# Patient Record
Sex: MALE | Race: WHITE | Employment: FULL TIME | URBAN - METROPOLITAN AREA
[De-identification: names, ages, dates, MRNs, and addresses within clinical notes are randomized per-mention and may not be internally consistent; named-entity substitution may affect disease eponyms.]

---

## 2017-06-16 ENCOUNTER — TELEPHONE (OUTPATIENT)
Dept: FAMILY MEDICINE CLINIC | Age: 57
End: 2017-06-16

## 2017-06-16 RX ORDER — BLOOD-GLUCOSE METER
EACH MISCELLANEOUS
Qty: 1 KIT | Refills: 0 | Status: SHIPPED | OUTPATIENT
Start: 2017-06-16 | End: 2018-02-07 | Stop reason: SDUPTHER

## 2017-06-16 RX ORDER — LANCETS 30 GAUGE
EACH MISCELLANEOUS
Qty: 100 EACH | Refills: 3 | Status: SHIPPED | OUTPATIENT
Start: 2017-06-16

## 2017-06-22 PROBLEM — M19.90 SENILE ARTHRITIS: Status: ACTIVE | Noted: 2017-06-22

## 2017-08-25 RX ORDER — LISINOPRIL 5 MG/1
TABLET ORAL
Qty: 30 TABLET | Refills: 3 | Status: SHIPPED | OUTPATIENT
Start: 2017-08-25 | End: 2017-11-02 | Stop reason: SDUPTHER

## 2017-08-25 RX ORDER — LISINOPRIL 5 MG/1
TABLET ORAL
Qty: 30 TABLET | Refills: 0 | OUTPATIENT
Start: 2017-08-25

## 2017-08-25 RX ORDER — MELOXICAM 7.5 MG/1
TABLET ORAL
Qty: 30 TABLET | Refills: 3 | Status: SHIPPED | OUTPATIENT
Start: 2017-08-25 | End: 2017-11-02 | Stop reason: SDUPTHER

## 2017-11-02 ENCOUNTER — OFFICE VISIT (OUTPATIENT)
Dept: FAMILY MEDICINE CLINIC | Age: 57
End: 2017-11-02

## 2017-11-02 VITALS
TEMPERATURE: 97.7 F | HEIGHT: 68 IN | HEART RATE: 84 BPM | DIASTOLIC BLOOD PRESSURE: 90 MMHG | SYSTOLIC BLOOD PRESSURE: 142 MMHG | OXYGEN SATURATION: 95 % | WEIGHT: 280 LBS | BODY MASS INDEX: 42.44 KG/M2

## 2017-11-02 DIAGNOSIS — E11.65 CONTROLLED TYPE 2 DIABETES MELLITUS WITH HYPERGLYCEMIA, WITHOUT LONG-TERM CURRENT USE OF INSULIN (HCC): ICD-10-CM

## 2017-11-02 DIAGNOSIS — Z12.5 SCREENING PSA (PROSTATE SPECIFIC ANTIGEN): ICD-10-CM

## 2017-11-02 DIAGNOSIS — Z00.00 ANNUAL PHYSICAL EXAM: Primary | ICD-10-CM

## 2017-11-02 DIAGNOSIS — R53.83 FATIGUE, UNSPECIFIED TYPE: ICD-10-CM

## 2017-11-02 DIAGNOSIS — I10 ESSENTIAL HYPERTENSION: ICD-10-CM

## 2017-11-02 DIAGNOSIS — Z13.220 SCREENING, LIPID: ICD-10-CM

## 2017-11-02 DIAGNOSIS — R06.83 SNORING: ICD-10-CM

## 2017-11-02 DIAGNOSIS — Z23 NEEDS FLU SHOT: ICD-10-CM

## 2017-11-02 DIAGNOSIS — M72.2 PLANTAR FASCIITIS OF LEFT FOOT: ICD-10-CM

## 2017-11-02 LAB
ALBUMIN SERPL-MCNC: 4.2 G/DL (ref 3.9–4.9)
ALP BLD-CCNC: 92 U/L (ref 35–104)
ALT SERPL-CCNC: 171 U/L (ref 0–41)
ANION GAP SERPL CALCULATED.3IONS-SCNC: 14 MEQ/L (ref 7–13)
AST SERPL-CCNC: 98 U/L (ref 0–40)
BASOPHILS ABSOLUTE: 0 K/UL (ref 0–0.2)
BASOPHILS RELATIVE PERCENT: 0.4 %
BILIRUB SERPL-MCNC: 0.4 MG/DL (ref 0–1.2)
BILIRUBIN URINE: NEGATIVE
BLOOD, URINE: NEGATIVE
BUN BLDV-MCNC: 14 MG/DL (ref 6–20)
CALCIUM SERPL-MCNC: 9.2 MG/DL (ref 8.6–10.2)
CHLORIDE BLD-SCNC: 95 MEQ/L (ref 98–107)
CHOLESTEROL, TOTAL: 238 MG/DL (ref 0–199)
CLARITY: ABNORMAL
CO2: 27 MEQ/L (ref 22–29)
COLOR: ABNORMAL
CREAT SERPL-MCNC: 0.66 MG/DL (ref 0.7–1.2)
CREATININE URINE: 238.1 MG/DL
EOSINOPHILS ABSOLUTE: 0.1 K/UL (ref 0–0.7)
EOSINOPHILS RELATIVE PERCENT: 1.9 %
GFR AFRICAN AMERICAN: >60
GFR NON-AFRICAN AMERICAN: >60
GLOBULIN: 2.9 G/DL (ref 2.3–3.5)
GLUCOSE BLD-MCNC: 174 MG/DL (ref 74–109)
GLUCOSE URINE: 100 MG/DL
HBA1C MFR BLD: 9 % (ref 4.8–5.9)
HCT VFR BLD CALC: 45.4 % (ref 42–52)
HDLC SERPL-MCNC: 39 MG/DL (ref 40–59)
HEMOGLOBIN: 14.8 G/DL (ref 14–18)
KETONES, URINE: NEGATIVE MG/DL
LDL CHOLESTEROL CALCULATED: 150 MG/DL (ref 0–129)
LEUKOCYTE ESTERASE, URINE: NEGATIVE
LYMPHOCYTES ABSOLUTE: 0.8 K/UL (ref 1–4.8)
LYMPHOCYTES RELATIVE PERCENT: 12.1 %
MCH RBC QN AUTO: 29.1 PG (ref 27–31.3)
MCHC RBC AUTO-ENTMCNC: 32.6 % (ref 33–37)
MCV RBC AUTO: 89.2 FL (ref 80–100)
MICROALBUMIN UR-MCNC: 9.8 MG/DL
MICROALBUMIN/CREAT UR-RTO: 41.2 MG/G (ref 0–30)
MONOCYTES ABSOLUTE: 0.5 K/UL (ref 0.2–0.8)
MONOCYTES RELATIVE PERCENT: 7.9 %
NEUTROPHILS ABSOLUTE: 5 K/UL (ref 1.4–6.5)
NEUTROPHILS RELATIVE PERCENT: 77.7 %
NITRITE, URINE: NEGATIVE
PDW BLD-RTO: 15 % (ref 11.5–14.5)
PH UA: 5.5 (ref 5–9)
PLATELET # BLD: 132 K/UL (ref 130–400)
POTASSIUM SERPL-SCNC: 4.6 MEQ/L (ref 3.5–5.1)
PROSTATE SPECIFIC ANTIGEN: 0.46 NG/ML (ref 0–3.89)
PROTEIN UA: ABNORMAL MG/DL
RBC # BLD: 5.09 M/UL (ref 4.7–6.1)
SODIUM BLD-SCNC: 136 MEQ/L (ref 132–144)
SPECIFIC GRAVITY UA: 1.03 (ref 1–1.03)
T4 FREE: 1.09 NG/DL (ref 0.93–1.7)
TOTAL PROTEIN: 7.1 G/DL (ref 6.4–8.1)
TRIGL SERPL-MCNC: 244 MG/DL (ref 0–200)
TSH SERPL DL<=0.05 MIU/L-ACNC: 3.21 UIU/ML (ref 0.27–4.2)
UROBILINOGEN, URINE: 0.2 E.U./DL
WBC # BLD: 6.5 K/UL (ref 4.8–10.8)

## 2017-11-02 PROCEDURE — 99396 PREV VISIT EST AGE 40-64: CPT | Performed by: FAMILY MEDICINE

## 2017-11-02 PROCEDURE — 90471 IMMUNIZATION ADMIN: CPT | Performed by: FAMILY MEDICINE

## 2017-11-02 PROCEDURE — 90688 IIV4 VACCINE SPLT 0.5 ML IM: CPT | Performed by: FAMILY MEDICINE

## 2017-11-02 RX ORDER — LISINOPRIL 10 MG/1
TABLET ORAL
Qty: 90 TABLET | Refills: 3 | Status: SHIPPED | OUTPATIENT
Start: 2017-11-02 | End: 2018-12-12 | Stop reason: SDUPTHER

## 2017-11-02 RX ORDER — MELOXICAM 7.5 MG/1
TABLET ORAL
Qty: 90 TABLET | Refills: 1 | Status: SHIPPED | OUTPATIENT
Start: 2017-11-02 | End: 2018-02-03 | Stop reason: SINTOL

## 2017-11-02 NOTE — PROGRESS NOTES
Chief Complaint   Patient presents with    Annual Exam     HPI: Karol Vasquez is a 65 y.o. male presenting for Annual Physical.  I last saw the patient 11 months ago. He is having some difficulty sleeping and wakes up about every hour. He usually is a deep sleeper. It has been going on for several months. He does note fatigue during the day and drifts off to sleep during the day if he sits down. He does admit to snoring quite loudly. His blood sugars have been running in the around 200 for the past 2 months and previous to that was in the 100 range. He does wear a compression stocking on the left leg and notes some plantar fasciitis. He does try to stretch the left foot. Past Medical History:   Diagnosis Date    Abdominal pain 9/11/2013    Abdominal pain, resolved 9/18/2013    Diverticulitis     RBBB        Past Surgical History:   Procedure Laterality Date    COLONOSCOPY  10/14/2013    DR. PHILLIPS    EYE SURGERY Bilateral     KNEE ARTHROSCOPY Left 2002    Dr. Lane Maloney         family history includes Cancer (age of onset: 62) in his brother; Diabetes in his brother, brother, and mother; Heart Disease in his brother; High Blood Pressure in his brother. Social History     Social History    Marital status:      Spouse name: N/A    Number of children: N/A    Years of education: N/A     Occupational History    Not on file.      Social History Main Topics    Smoking status: Never Smoker    Smokeless tobacco: Never Used    Alcohol use No    Drug use: Unknown    Sexual activity: Not on file     Other Topics Concern    Not on file     Social History Narrative    No narrative on file       Allergies   Allergen Reactions    Demeclocycline Hcl     Pcn [Penicillins]        Review of Systems - General ROS: negative  Psychological ROS: negative  ENT ROS: negative  Hematological and Lymphatic ROS: negative  Respiratory ROS: no cough, shortness of breath, or wheezing  Cardiovascular ROS: no chest pain or dyspnea on exertion  Gastrointestinal ROS: no abdominal pain, change in bowel habits, or black or bloody stools  Genito-Urinary ROS: no dysuria, trouble voiding, or hematuria  Musculoskeletal ROS: negative  Neurological ROS: no TIA or stroke symptoms  Dermatological ROS: negative    Vitals:    11/02/17 1125 11/02/17 1135 11/02/17 1200   BP: (!) 140/100 (!) 128/90 (!) 142/90   Pulse: 84     Temp: 97.7 °F (36.5 °C)     TempSrc: Temporal     SpO2: 95%     Weight: 280 lb (127 kg)     Height: 5' 8\" (1.727 m)       Physical Examination: General appearance - alert, well appearing, and in no distress, normal appearing weight and acyanotic, in no respiratory distress  Mental status - alert, oriented to person, place, and time, normal mood, behavior, speech, dress, motor activity, and thought processes, affect appropriate to mood  Ears - bilateral TM's and external ear canals normal  Nose - normal and patent, no erythema, discharge or polyps  Mouth - mucous membranes moist, pharynx normal without lesions  Neck - supple, no significant adenopathy, carotids upstroke normal bilaterally, no bruits, thyroid exam: thyroid is normal in size without nodules or tenderness  Chest - clear to auscultation, no wheezes, rales or rhonchi, symmetric air entry  Heart - normal rate, regular rhythm, normal S1, S2, no murmurs, rubs, clicks or gallops  Abdomen - soft, nontender, nondistended, no masses or organomegaly  bowel sounds normal  Rectal exam: Normal anal opening. Normal sphincter tone. MARISELA revealed normal prostate without nodularity. Stool in the rectal vault was brown and guaiac negative. No masses appreciated. Neurological - alert, oriented, normal speech, no focal findings or movement disorder noted, cranial nerves II through XII intact, DTR's normal and symmetric, normal muscle tone, no tremors, strength 5/5.   Extremities - peripheral pulses normal, no pedal edema, no clubbing or

## 2017-11-03 DIAGNOSIS — R79.89 ELEVATED LFTS: Primary | ICD-10-CM

## 2017-11-21 ENCOUNTER — HOSPITAL ENCOUNTER (OUTPATIENT)
Dept: SLEEP CENTER | Age: 57
Discharge: HOME | End: 2017-11-21
Payer: COMMERCIAL

## 2017-12-08 ENCOUNTER — TELEPHONE (OUTPATIENT)
Dept: FAMILY MEDICINE CLINIC | Age: 57
End: 2017-12-08

## 2017-12-08 DIAGNOSIS — G47.33 OSA (OBSTRUCTIVE SLEEP APNEA): Primary | ICD-10-CM

## 2017-12-08 NOTE — TELEPHONE ENCOUNTER
Insurance is not willing to pay for the CPAP titration and are recommending an Auto pap be prescribed.  rx is pending- check settings

## 2018-02-01 ENCOUNTER — OFFICE VISIT (OUTPATIENT)
Dept: FAMILY MEDICINE CLINIC | Age: 58
End: 2018-02-01
Payer: COMMERCIAL

## 2018-02-01 VITALS
SYSTOLIC BLOOD PRESSURE: 122 MMHG | DIASTOLIC BLOOD PRESSURE: 76 MMHG | BODY MASS INDEX: 41.28 KG/M2 | HEART RATE: 100 BPM | WEIGHT: 272.4 LBS | HEIGHT: 68 IN | OXYGEN SATURATION: 96 % | TEMPERATURE: 96.1 F

## 2018-02-01 DIAGNOSIS — R79.89 ELEVATED LFTS: ICD-10-CM

## 2018-02-01 DIAGNOSIS — Z99.89 OSA ON CPAP: ICD-10-CM

## 2018-02-01 DIAGNOSIS — E11.00 UNCONTROLLED TYPE 2 DIABETES MELLITUS WITH HYPEROSMOLARITY WITHOUT COMA, WITHOUT LONG-TERM CURRENT USE OF INSULIN (HCC): Primary | ICD-10-CM

## 2018-02-01 DIAGNOSIS — E11.00 UNCONTROLLED TYPE 2 DIABETES MELLITUS WITH HYPEROSMOLARITY WITHOUT COMA, WITHOUT LONG-TERM CURRENT USE OF INSULIN (HCC): ICD-10-CM

## 2018-02-01 DIAGNOSIS — N52.9 VASCULOGENIC ERECTILE DYSFUNCTION, UNSPECIFIED VASCULOGENIC ERECTILE DYSFUNCTION TYPE: ICD-10-CM

## 2018-02-01 DIAGNOSIS — I10 ESSENTIAL HYPERTENSION: ICD-10-CM

## 2018-02-01 DIAGNOSIS — G47.33 OSA ON CPAP: ICD-10-CM

## 2018-02-01 LAB
ALBUMIN SERPL-MCNC: 4.3 G/DL (ref 3.9–4.9)
ALP BLD-CCNC: 90 U/L (ref 35–104)
ALT SERPL-CCNC: 161 U/L (ref 0–41)
ANION GAP SERPL CALCULATED.3IONS-SCNC: 15 MEQ/L (ref 7–13)
AST SERPL-CCNC: 73 U/L (ref 0–40)
BILIRUB SERPL-MCNC: 0.3 MG/DL (ref 0–1.2)
BUN BLDV-MCNC: 18 MG/DL (ref 6–20)
CALCIUM SERPL-MCNC: 9.3 MG/DL (ref 8.6–10.2)
CHLORIDE BLD-SCNC: 100 MEQ/L (ref 98–107)
CO2: 26 MEQ/L (ref 22–29)
CREAT SERPL-MCNC: 0.93 MG/DL (ref 0.7–1.2)
FERRITIN: 711.3 NG/ML (ref 30–400)
GFR AFRICAN AMERICAN: >60
GFR NON-AFRICAN AMERICAN: >60
GLOBULIN: 2.7 G/DL (ref 2.3–3.5)
GLUCOSE BLD-MCNC: 201 MG/DL (ref 74–109)
HAV IGM SER IA-ACNC: NORMAL
HBA1C MFR BLD: 9.5 % (ref 4.8–5.9)
HEPATITIS B CORE IGM ANTIBODY: NORMAL
HEPATITIS B SURFACE ANTIGEN INTERPRETATION: NORMAL
HEPATITIS C ANTIBODY INTERPRETATION: NORMAL
HEPATITIS INTERPRETATION:: NORMAL
IRON SATURATION: 33 % (ref 11–46)
IRON: 96 UG/DL (ref 59–158)
POTASSIUM SERPL-SCNC: 4.9 MEQ/L (ref 3.5–5.1)
SODIUM BLD-SCNC: 141 MEQ/L (ref 132–144)
TOTAL IRON BINDING CAPACITY: 287 UG/DL (ref 178–450)
TOTAL PROTEIN: 7 G/DL (ref 6.4–8.1)

## 2018-02-01 PROCEDURE — 99214 OFFICE O/P EST MOD 30 MIN: CPT | Performed by: FAMILY MEDICINE

## 2018-02-01 RX ORDER — ROSUVASTATIN CALCIUM 10 MG/1
10 TABLET, COATED ORAL NIGHTLY
Qty: 30 TABLET | Refills: 5 | Status: SHIPPED | OUTPATIENT
Start: 2018-02-01 | End: 2018-02-12 | Stop reason: SINTOL

## 2018-02-01 ASSESSMENT — PATIENT HEALTH QUESTIONNAIRE - PHQ9
SUM OF ALL RESPONSES TO PHQ9 QUESTIONS 1 & 2: 0
SUM OF ALL RESPONSES TO PHQ QUESTIONS 1-9: 0
1. LITTLE INTEREST OR PLEASURE IN DOING THINGS: 0
2. FEELING DOWN, DEPRESSED OR HOPELESS: 0

## 2018-02-01 NOTE — PROGRESS NOTES
Chief Complaint   Patient presents with    Follow-up     LOV 11/02/2017    Diabetes Mellitus     Type 2 w/ Hyperglycemia;  this AM    Hypertension    Fatigue    Snoring     HPI: Marcella Cano is a 65 y.o. male presenting for follow-up of DM Type 2, HTN, Fatigue, and Snoring. I last saw the patient 11/02/2017. He has been using his CPAP for 2-3 months and it is working well for him. He was off of it for 2 weeks while he had a chest cold. He just restarted it about 5 days ago. His energy is improved. He is sleeping with it for 7-8 hours. He does note that his love life Is suffering due to some difficulty with obtaining and maintaining an erection. He would like to try an ED medication. Patient's blood sugar was 170 this morning. He is trying to lose weight. Past Medical History:   Diagnosis Date    Abdominal pain 9/11/2013    Abdominal pain, resolved 9/18/2013    Diverticulitis     RBBB        Past Surgical History:   Procedure Laterality Date    COLONOSCOPY  10/14/2013    DR. PHILLIPS    EYE SURGERY Bilateral     KNEE ARTHROSCOPY Left 2002    Dr. Elver Marroquin         family history includes Cancer (age of onset: 62) in his brother; Diabetes in his brother, brother, and mother; Heart Disease in his brother; High Blood Pressure in his brother. Social History     Social History    Marital status:      Spouse name: N/A    Number of children: N/A    Years of education: N/A     Occupational History    Not on file.      Social History Main Topics    Smoking status: Never Smoker    Smokeless tobacco: Never Used    Alcohol use No    Drug use: Unknown    Sexual activity: Not on file     Other Topics Concern    Not on file     Social History Narrative    No narrative on file       Allergies   Allergen Reactions    Demeclocycline Hcl     Pcn [Penicillins]        Review of Systems - General ROS: negative  Psychological ROS: negative  ENT ROS: no suspicious skin lesions noted. 1. Uncontrolled type 2 diabetes mellitus with hyperosmolarity without coma, without long-term current use of insulin ([facility])   DIABETES FOOT EXAM    Hemoglobin A1C   2. Essential hypertension  Comprehensive Metabolic Panel   3. AMBER on CPAP     4. Vasculogenic erectile dysfunction, unspecified vasculogenic erectile dysfunction type       I have reviewed the following diagnostic data: NA.  Please see report for additional information. Orders Placed This Encounter   Procedures    Hemoglobin A1C     Standing Status:   Future     Number of Occurrences:   1     Standing Expiration Date:   2/1/2019    Comprehensive Metabolic Panel     Standing Status:   Future     Number of Occurrences:   1     Standing Expiration Date:   2/1/2019     DIABETES FOOT EXAM       Orders Placed This Encounter   Medications    rosuvastatin (CRESTOR) 10 MG tablet     Sig: Take 1 tablet by mouth nightly     Dispense:  30 tablet     Refill:  5     Patient will need labs today. Will call patient with results of testing when available and need for follow up if indicated. Patient will continue with his CPAP machine on a nightly basis. Continue diabetic diet, exercise and weight loss. Patient was given a prescription for Crestor 5 mg by mouth daily. However, he will hold the prescription until instructed to fill it based upon his lab work results to recheck his LFTs. Return in about 3 months (around 5/1/2018) for follow up on medications, follow up on DM Type II, follow up on HTN.

## 2018-02-03 DIAGNOSIS — R79.89 ELEVATED LFTS: Primary | ICD-10-CM

## 2018-02-03 LAB
ALPHA-1 ANTITRYPSIN: 124 MG/DL (ref 90–200)
CERULOPLASMIN: 27 MG/DL (ref 17–54)
COPPER: 104 UG/DL (ref 70–140)
MITOCHONDRIAL M2 AB, IGG: 5.6 UNITS (ref 0–20)

## 2018-02-09 DIAGNOSIS — R79.89 ELEVATED LFTS: Primary | ICD-10-CM

## 2018-02-12 ENCOUNTER — HOSPITAL ENCOUNTER (OUTPATIENT)
Dept: ULTRASOUND IMAGING | Age: 58
Discharge: HOME | End: 2018-02-14
Payer: COMMERCIAL

## 2018-02-12 ENCOUNTER — TELEPHONE (OUTPATIENT)
Dept: FAMILY MEDICINE CLINIC | Age: 58
End: 2018-02-12

## 2018-02-12 DIAGNOSIS — R79.89 ELEVATED LFTS: ICD-10-CM

## 2018-02-12 DIAGNOSIS — E11.65 CONTROLLED TYPE 2 DIABETES MELLITUS WITH HYPERGLYCEMIA, WITHOUT LONG-TERM CURRENT USE OF INSULIN (HCC): Primary | ICD-10-CM

## 2018-02-12 DIAGNOSIS — R74.8 ELEVATED LIVER ENZYMES: ICD-10-CM

## 2018-02-12 DIAGNOSIS — E78.2 MIXED HYPERLIPIDEMIA: ICD-10-CM

## 2018-02-12 DIAGNOSIS — K80.20 CALCULUS OF GALLBLADDER WITHOUT CHOLECYSTITIS WITHOUT OBSTRUCTION: ICD-10-CM

## 2018-02-12 DIAGNOSIS — K76.0 HEPATIC STEATOSIS: Primary | ICD-10-CM

## 2018-02-12 PROCEDURE — 76705 ECHO EXAM OF ABDOMEN: CPT

## 2018-02-12 RX ORDER — GEMFIBROZIL 600 MG/1
600 TABLET, FILM COATED ORAL
Qty: 60 TABLET | Refills: 3 | Status: SHIPPED | OUTPATIENT
Start: 2018-02-12 | End: 2018-06-22 | Stop reason: SDUPTHER

## 2018-02-12 NOTE — TELEPHONE ENCOUNTER
Please call patient and let him know to not take the Crestor. I have prescribed him Lopid. He needs to take that and to make sure that he is following a healthy low fat/low cholesterol diet and exercise program.  He also needs to return after 3/26 for repeat labs.

## 2018-02-12 NOTE — TELEPHONE ENCOUNTER
Prescription for glucometer was sent over to the pharmacy on 2/7/18, patient is needing a prescription for test strips. Please advise.

## 2018-05-09 ENCOUNTER — OFFICE VISIT (OUTPATIENT)
Dept: FAMILY MEDICINE CLINIC | Age: 58
End: 2018-05-09
Payer: COMMERCIAL

## 2018-05-09 VITALS
BODY MASS INDEX: 42.59 KG/M2 | SYSTOLIC BLOOD PRESSURE: 124 MMHG | OXYGEN SATURATION: 97 % | HEIGHT: 68 IN | DIASTOLIC BLOOD PRESSURE: 82 MMHG | HEART RATE: 64 BPM | TEMPERATURE: 97.4 F | WEIGHT: 281 LBS

## 2018-05-09 DIAGNOSIS — E78.2 MIXED HYPERLIPIDEMIA: ICD-10-CM

## 2018-05-09 DIAGNOSIS — E11.65 CONTROLLED TYPE 2 DIABETES MELLITUS WITH HYPERGLYCEMIA, WITHOUT LONG-TERM CURRENT USE OF INSULIN (HCC): Primary | ICD-10-CM

## 2018-05-09 DIAGNOSIS — Z23 NEED FOR PNEUMOCOCCAL VACCINATION: ICD-10-CM

## 2018-05-09 DIAGNOSIS — I10 ESSENTIAL HYPERTENSION: ICD-10-CM

## 2018-05-09 DIAGNOSIS — E11.65 CONTROLLED TYPE 2 DIABETES MELLITUS WITH HYPERGLYCEMIA, WITHOUT LONG-TERM CURRENT USE OF INSULIN (HCC): ICD-10-CM

## 2018-05-09 LAB
ALBUMIN SERPL-MCNC: 4.5 G/DL (ref 3.9–4.9)
ALP BLD-CCNC: 82 U/L (ref 35–104)
ALT SERPL-CCNC: 91 U/L (ref 0–41)
AST SERPL-CCNC: 47 U/L (ref 0–40)
BILIRUB SERPL-MCNC: 0.5 MG/DL (ref 0–1.2)
BILIRUBIN DIRECT: <0.2 MG/DL (ref 0–0.3)
BILIRUBIN, INDIRECT: ABNORMAL MG/DL (ref 0–0.6)
CHOLESTEROL, TOTAL: 243 MG/DL (ref 0–199)
HBA1C MFR BLD: 8.9 % (ref 4.8–5.9)
HDLC SERPL-MCNC: 40 MG/DL (ref 40–59)
LDL CHOLESTEROL CALCULATED: 172 MG/DL (ref 0–129)
TOTAL PROTEIN: 7 G/DL (ref 6.4–8.1)
TRIGL SERPL-MCNC: 155 MG/DL (ref 0–200)

## 2018-05-09 PROCEDURE — 90471 IMMUNIZATION ADMIN: CPT | Performed by: FAMILY MEDICINE

## 2018-05-09 PROCEDURE — 99214 OFFICE O/P EST MOD 30 MIN: CPT | Performed by: FAMILY MEDICINE

## 2018-05-09 PROCEDURE — 90732 PPSV23 VACC 2 YRS+ SUBQ/IM: CPT | Performed by: FAMILY MEDICINE

## 2018-05-09 RX ORDER — MELOXICAM 7.5 MG/1
1 TABLET ORAL DAILY
Refills: 1 | COMMUNITY
Start: 2018-04-17 | End: 2018-10-03 | Stop reason: SDUPTHER

## 2018-05-09 ASSESSMENT — PATIENT HEALTH QUESTIONNAIRE - PHQ9
SUM OF ALL RESPONSES TO PHQ9 QUESTIONS 1 & 2: 0
1. LITTLE INTEREST OR PLEASURE IN DOING THINGS: 0
SUM OF ALL RESPONSES TO PHQ QUESTIONS 1-9: 0
2. FEELING DOWN, DEPRESSED OR HOPELESS: 0

## 2018-05-12 RX ORDER — EZETIMIBE 10 MG/1
10 TABLET ORAL DAILY
Qty: 30 TABLET | Refills: 11 | Status: SHIPPED | OUTPATIENT
Start: 2018-05-12 | End: 2019-05-12

## 2018-05-15 ENCOUNTER — TELEPHONE (OUTPATIENT)
Dept: FAMILY MEDICINE CLINIC | Age: 58
End: 2018-05-15

## 2018-07-25 ENCOUNTER — TELEPHONE (OUTPATIENT)
Dept: PHARMACY | Facility: CLINIC | Age: 58
End: 2018-07-25

## 2018-07-25 NOTE — TELEPHONE ENCOUNTER
CLINICAL PHARMACY: ADHERENCE REVIEW    Identified care gap per [place]: Lisinopril and Metformin adherence  Per records, appears 30-day supply last filled on 5/21/18. Patient is interested in changing to a 90-day supply at this time. Send to:  [facility] [address], [address] [phone] - F [phone]    Samantha Arriaza.  [address]  Direct: [phone]  Department, toll free: [phone], option 7

## 2018-08-15 ENCOUNTER — OFFICE VISIT (OUTPATIENT)
Dept: FAMILY MEDICINE CLINIC | Age: 58
End: 2018-08-15
Payer: COMMERCIAL

## 2018-08-15 VITALS
TEMPERATURE: 97.2 F | DIASTOLIC BLOOD PRESSURE: 70 MMHG | BODY MASS INDEX: 40.44 KG/M2 | SYSTOLIC BLOOD PRESSURE: 122 MMHG | RESPIRATION RATE: 20 BRPM | HEIGHT: 68 IN | WEIGHT: 266.8 LBS | HEART RATE: 64 BPM

## 2018-08-15 DIAGNOSIS — I10 ESSENTIAL HYPERTENSION: ICD-10-CM

## 2018-08-15 DIAGNOSIS — E11.65 CONTROLLED TYPE 2 DIABETES MELLITUS WITH HYPERGLYCEMIA, WITHOUT LONG-TERM CURRENT USE OF INSULIN (HCC): Primary | ICD-10-CM

## 2018-08-15 DIAGNOSIS — R74.8 ELEVATED LIVER ENZYMES: ICD-10-CM

## 2018-08-15 DIAGNOSIS — E78.2 MIXED HYPERLIPIDEMIA: ICD-10-CM

## 2018-08-15 DIAGNOSIS — E11.65 CONTROLLED TYPE 2 DIABETES MELLITUS WITH HYPERGLYCEMIA, WITHOUT LONG-TERM CURRENT USE OF INSULIN (HCC): ICD-10-CM

## 2018-08-15 DIAGNOSIS — R61 EXCESS, SECRETION, SWEAT: ICD-10-CM

## 2018-08-15 LAB
ALBUMIN SERPL-MCNC: 4.6 G/DL (ref 3.9–4.9)
ALP BLD-CCNC: 82 U/L (ref 35–104)
ALT SERPL-CCNC: 46 U/L (ref 0–41)
ANION GAP SERPL CALCULATED.3IONS-SCNC: 17 MEQ/L (ref 7–13)
AST SERPL-CCNC: 32 U/L (ref 0–40)
BASOPHILS ABSOLUTE: 0 K/UL (ref 0–0.2)
BASOPHILS RELATIVE PERCENT: 0.5 %
BILIRUB SERPL-MCNC: 0.4 MG/DL (ref 0–1.2)
BUN BLDV-MCNC: 13 MG/DL (ref 6–20)
CALCIUM SERPL-MCNC: 9.6 MG/DL (ref 8.6–10.2)
CHLORIDE BLD-SCNC: 97 MEQ/L (ref 98–107)
CHOLESTEROL, TOTAL: 190 MG/DL (ref 0–199)
CO2: 25 MEQ/L (ref 22–29)
CREAT SERPL-MCNC: 0.78 MG/DL (ref 0.7–1.2)
EOSINOPHILS ABSOLUTE: 0.1 K/UL (ref 0–0.7)
EOSINOPHILS RELATIVE PERCENT: 1.7 %
GFR AFRICAN AMERICAN: >60
GFR NON-AFRICAN AMERICAN: >60
GLOBULIN: 2.9 G/DL (ref 2.3–3.5)
GLUCOSE BLD-MCNC: 147 MG/DL (ref 74–109)
HBA1C MFR BLD: 8.2 % (ref 4.8–5.9)
HCT VFR BLD CALC: 41.8 % (ref 42–52)
HDLC SERPL-MCNC: 39 MG/DL (ref 40–59)
HEMOGLOBIN: 14.3 G/DL (ref 14–18)
LDL CHOLESTEROL CALCULATED: 128 MG/DL (ref 0–129)
LYMPHOCYTES ABSOLUTE: 0.8 K/UL (ref 1–4.8)
LYMPHOCYTES RELATIVE PERCENT: 11.2 %
MCH RBC QN AUTO: 30.3 PG (ref 27–31.3)
MCHC RBC AUTO-ENTMCNC: 34.2 % (ref 33–37)
MCV RBC AUTO: 88.7 FL (ref 80–100)
MONOCYTES ABSOLUTE: 0.5 K/UL (ref 0.2–0.8)
MONOCYTES RELATIVE PERCENT: 8.1 %
NEUTROPHILS ABSOLUTE: 5.3 K/UL (ref 1.4–6.5)
NEUTROPHILS RELATIVE PERCENT: 78.5 %
PDW BLD-RTO: 14.2 % (ref 11.5–14.5)
PLATELET # BLD: 150 K/UL (ref 130–400)
POTASSIUM SERPL-SCNC: 4.7 MEQ/L (ref 3.5–5.1)
RBC # BLD: 4.71 M/UL (ref 4.7–6.1)
SODIUM BLD-SCNC: 139 MEQ/L (ref 132–144)
T4 FREE: 1.02 NG/DL (ref 0.93–1.7)
TOTAL PROTEIN: 7.5 G/DL (ref 6.4–8.1)
TRIGL SERPL-MCNC: 116 MG/DL (ref 0–200)
TSH SERPL DL<=0.05 MIU/L-ACNC: 2.98 UIU/ML (ref 0.27–4.2)
WBC # BLD: 6.7 K/UL (ref 4.8–10.8)

## 2018-08-15 PROCEDURE — 99214 OFFICE O/P EST MOD 30 MIN: CPT | Performed by: FAMILY MEDICINE

## 2018-08-15 ASSESSMENT — ENCOUNTER SYMPTOMS
WHEEZING: 0
VOMITING: 0
SINUS PAIN: 0
NAUSEA: 0
CONSTIPATION: 0
BLOOD IN STOOL: 0
RHINORRHEA: 0
ABDOMINAL PAIN: 0
COLOR CHANGE: 0
VOICE CHANGE: 0
SORE THROAT: 0
CHEST TIGHTNESS: 0
TROUBLE SWALLOWING: 0
SHORTNESS OF BREATH: 0
COUGH: 0
DIARRHEA: 0

## 2018-08-15 NOTE — PROGRESS NOTES
Chief Complaint   Patient presents with    Follow-up     LOV 05/09/2018    Diabetes Mellitus     Type 2    Hyperlipidemia    Hypertension    Discuss Medications     HPI: Shaina Aranda is a 63 y.o. male presenting for follow-up of DM Type 2. HTN, and HLD. I last saw the patient 05/09/2018. He is feeling well good and notes that his blood sugars are in a good range between 120-145. He is taking his medications without difficulty. He did not eat breakfast this morning. He does sweat a lot with little exertion. He does note that a brother recently had an MI and had 2 stents placed in the proximal LAD. Patient denies any chest pain or shortness of breath. Past Medical History:   Diagnosis Date    Abdominal pain 9/11/2013    Abdominal pain, resolved 9/18/2013    Diverticulitis     RBBB      Past Surgical History:   Procedure Laterality Date    COLONOSCOPY  10/14/2013    DR. PHILLIPS    EYE SURGERY Bilateral     KNEE ARTHROSCOPY Left 2002    Dr. Zaira Pelaez       family history includes Cancer (age of onset: 62) in his brother; Diabetes in his brother, brother, and mother; Heart Attack in his brother; Heart Disease in his brother; High Blood Pressure in his brother. Social History     Social History    Marital status:      Spouse name: N/A    Number of children: N/A    Years of education: N/A     Occupational History    Not on file.      Social History Main Topics    Smoking status: Never Smoker    Smokeless tobacco: Never Used    Alcohol use No    Drug use: Unknown    Sexual activity: Not on file     Other Topics Concern    Not on file     Social History Narrative    No narrative on file       Current Outpatient Prescriptions on File Prior to Visit   Medication Sig Dispense Refill    ACCU-CHEK WATSON PLUS strip TEST ONCE DAILY 100 strip 3    gemfibrozil (LOPID) 600 MG tablet TAKE 1 TABLET BY MOUTH TWICE DAILY BEFORE MEALS 60 tablet 5    ezetimibe (ZETIA) 10

## 2018-08-20 ENCOUNTER — HOSPITAL ENCOUNTER (OUTPATIENT)
Dept: NON INVASIVE DIAGNOSTICS | Age: 58
Discharge: HOME | End: 2018-08-20
Payer: COMMERCIAL

## 2018-08-20 VITALS — DIASTOLIC BLOOD PRESSURE: 80 MMHG | HEART RATE: 90 BPM | SYSTOLIC BLOOD PRESSURE: 160 MMHG

## 2018-08-20 DIAGNOSIS — E11.65 CONTROLLED TYPE 2 DIABETES MELLITUS WITH HYPERGLYCEMIA, WITHOUT LONG-TERM CURRENT USE OF INSULIN (HCC): ICD-10-CM

## 2018-08-20 PROCEDURE — 93017 CV STRESS TEST TRACING ONLY: CPT

## 2018-10-03 RX ORDER — MELOXICAM 7.5 MG/1
TABLET ORAL
Qty: 90 TABLET | Refills: 0 | Status: SHIPPED | OUTPATIENT
Start: 2018-10-03 | End: 2018-11-21 | Stop reason: ALTCHOICE

## 2018-10-03 NOTE — TELEPHONE ENCOUNTER
Pharmacy requesting refill. Medication pended for approval/denial. Thank you.     LOV  8/15/2018    NEXT APPT:  Future Appointments  Date Time Provider Erik Brody   11/21/2018 5:15 PM Myriam Nolen MD [facility]

## 2018-11-21 ENCOUNTER — OFFICE VISIT (OUTPATIENT)
Dept: FAMILY MEDICINE CLINIC | Age: 58
End: 2018-11-21
Payer: COMMERCIAL

## 2018-11-21 VITALS
RESPIRATION RATE: 16 BRPM | DIASTOLIC BLOOD PRESSURE: 68 MMHG | HEIGHT: 68 IN | SYSTOLIC BLOOD PRESSURE: 104 MMHG | HEART RATE: 68 BPM | WEIGHT: 262.6 LBS | BODY MASS INDEX: 39.8 KG/M2 | TEMPERATURE: 97.2 F

## 2018-11-21 DIAGNOSIS — E11.65 CONTROLLED TYPE 2 DIABETES MELLITUS WITH HYPERGLYCEMIA, WITHOUT LONG-TERM CURRENT USE OF INSULIN (HCC): ICD-10-CM

## 2018-11-21 DIAGNOSIS — Z82.49 FH: CAD (CORONARY ARTERY DISEASE): ICD-10-CM

## 2018-11-21 DIAGNOSIS — Z23 NEEDS FLU SHOT: ICD-10-CM

## 2018-11-21 DIAGNOSIS — I10 ESSENTIAL HYPERTENSION: Primary | ICD-10-CM

## 2018-11-21 DIAGNOSIS — I45.10 RBBB: ICD-10-CM

## 2018-11-21 DIAGNOSIS — E78.2 MIXED HYPERLIPIDEMIA: ICD-10-CM

## 2018-11-21 PROCEDURE — 90688 IIV4 VACCINE SPLT 0.5 ML IM: CPT | Performed by: FAMILY MEDICINE

## 2018-11-21 PROCEDURE — 99214 OFFICE O/P EST MOD 30 MIN: CPT | Performed by: FAMILY MEDICINE

## 2018-11-21 PROCEDURE — 90471 IMMUNIZATION ADMIN: CPT | Performed by: FAMILY MEDICINE

## 2018-11-21 ASSESSMENT — ENCOUNTER SYMPTOMS
SORE THROAT: 0
VOICE CHANGE: 0
VOMITING: 0
ABDOMINAL PAIN: 0
CHEST TIGHTNESS: 0
WHEEZING: 0
COUGH: 0
BLOOD IN STOOL: 0
DIARRHEA: 0
CONSTIPATION: 0
RHINORRHEA: 0
SHORTNESS OF BREATH: 0
SINUS PAIN: 0
COLOR CHANGE: 0
NAUSEA: 0
TROUBLE SWALLOWING: 0

## 2018-11-21 NOTE — PROGRESS NOTES
Chief Complaint   Patient presents with    Follow-up     LOV 08/15/2018    Diabetes Mellitus     Type 2;  this Am    Hypertension    Hyperlipidemia    Elevated Hepatic Enzymes    Excessive Sweating     HPI: Svetlana Forde is a 63 y.o. male presenting for follow-up of DM Type 2, HTN, HLD, Elevated Liver Enzymes, and Excessive Sweating. I last saw the patient 08/15/2018. He is doing \"OK\" for the most part. His blood sugars are under good control. His reading was 122 this am and range of 140-160. Patient is concerned about his family history of coronary artery disease in 2 brothers.   Patient did have an exercise stress test in August that was normal.    Current Outpatient Prescriptions on File Prior to Visit   Medication Sig Dispense Refill    ACCU-CHEK WATSON PLUS strip TEST ONCE DAILY 100 strip 3    gemfibrozil (LOPID) 600 MG tablet TAKE 1 TABLET BY MOUTH TWICE DAILY BEFORE MEALS 60 tablet 5    ezetimibe (ZETIA) 10 MG tablet Take 1 tablet by mouth daily 30 tablet 11    SITagliptin (JANUVIA) 100 MG tablet Take 1 tablet by mouth daily 30 tablet 11    Blood Glucose Monitoring Suppl (ACCU-CHEK WATSON PLUS) w/Device KIT TEST ONCE DAILY AS DIRECTED 1 kit 0    CPAP Machine MISC by Does not apply route PRESSURE: Pressure 4cm H2O to 15cm H2O DX:Brian 1 each 0    lisinopril (PRINIVIL;ZESTRIL) 10 MG tablet TAKE 1 TABLET BY MOUTH EVERY DAY 90 tablet 3    metFORMIN (GLUCOPHAGE) 500 MG tablet Take 1 T PO  tablet 3    Lancets MISC FOR USE WITH ACCU-CHEK WATSON PLUS-Test once daily as directed 100 each 3     Current Facility-Administered Medications on File Prior to Visit   Medication Dose Route Frequency Provider Last Rate Last Dose    betamethasone acetate-betamethasone sodium phosphate (CELESTONE) injection 6 mg  6 mg Intra-Lesional Once Vaughn Chance MD           Past Medical History:   Diagnosis Date    Abdominal pain 9/11/2013    Abdominal pain, resolved 9/18/2013    Diverticulitis     RBBB entry  Heart - normal rate, regular rhythm, normal S1, S2, no murmurs, rubs, clicks or gallops. Abdomen - soft, nontender, nondistended, no masses or organomegaly. Obese, protuberant. bowel sounds normal.  Extremities - peripheral pulses normal, no pedal edema, no clubbing or cyanosis. Dorsalis pedis and posterior tibial pulses are symmetric. Skin - normal coloration and turgor, no rashes, no suspicious skin lesions noted. Diagnosis Orders   1. Essential hypertension  NM MYOCARDIAL SPECT REST EXERCISE OR RX   2. Mixed hyperlipidemia  Lipase    Hepatic Function Panel   3. RBBB  NM MYOCARDIAL SPECT REST EXERCISE OR RX   4. Controlled type 2 diabetes mellitus with hyperglycemia, without long-term current use of insulin (HCC)  NM MYOCARDIAL SPECT REST EXERCISE OR RX    Hemoglobin A1C   5. Needs flu shot  INFLUENZA, QUADV, 3 YRS AND OLDER, IM, MDV, 0.5ML ([facility])   6. FH: CAD (coronary artery disease)  NM MYOCARDIAL SPECT REST EXERCISE OR RX     I have reviewed the following diagnostic data: NA.  Please see report for additional information. Orders Placed This Encounter   Procedures    NM MYOCARDIAL SPECT REST EXERCISE OR RX     Standing Status:   Future     Standing Expiration Date:   11/21/2019     Order Specific Question:   Reason for Exam?     Answer:   Family history     Order Specific Question:   Reason for exam:     Answer:   risk factors: DM, HTN, Obesity.  INFLUENZA, QUADV, 3 YRS AND OLDER, IM, MDV, 0.5ML (FLUZONE QUADV)    Hemoglobin A1C     Standing Status:   Future     Standing Expiration Date:   11/21/2019    Lipase     Standing Status:   Future     Standing Expiration Date:   11/21/2019    Hepatic Function Panel     Standing Status:   Future     Standing Expiration Date:   11/21/2019     Patient will need fasting labs today. Will call patient with results of testing when available and need for follow up if indicated. Continue diabetic diet, exercise and weight loss.      Due to his

## 2018-12-03 ENCOUNTER — HOSPITAL ENCOUNTER (OUTPATIENT)
Dept: NUCLEAR MEDICINE | Age: 58
Discharge: HOME | End: 2018-12-05
Payer: COMMERCIAL

## 2018-12-03 ENCOUNTER — HOSPITAL ENCOUNTER (OUTPATIENT)
Dept: NON INVASIVE DIAGNOSTICS | Age: 58
Discharge: HOME | End: 2018-12-03

## 2018-12-03 VITALS — HEART RATE: 91 BPM | DIASTOLIC BLOOD PRESSURE: 85 MMHG | SYSTOLIC BLOOD PRESSURE: 173 MMHG

## 2018-12-03 DIAGNOSIS — Z82.49 FH: CAD (CORONARY ARTERY DISEASE): ICD-10-CM

## 2018-12-03 DIAGNOSIS — I10 ESSENTIAL HYPERTENSION: ICD-10-CM

## 2018-12-03 DIAGNOSIS — E11.65 CONTROLLED TYPE 2 DIABETES MELLITUS WITH HYPERGLYCEMIA, WITHOUT LONG-TERM CURRENT USE OF INSULIN (HCC): ICD-10-CM

## 2018-12-03 DIAGNOSIS — I45.10 RBBB: ICD-10-CM

## 2018-12-03 PROCEDURE — 93017 CV STRESS TEST TRACING ONLY: CPT

## 2018-12-03 PROCEDURE — 2580000003 HC RX 258: Performed by: FAMILY MEDICINE

## 2018-12-03 PROCEDURE — 3430000000 HC RX DIAGNOSTIC RADIOPHARMACEUTICAL: Performed by: FAMILY MEDICINE

## 2018-12-03 PROCEDURE — 78452 HT MUSCLE IMAGE SPECT MULT: CPT

## 2018-12-03 PROCEDURE — A9502 TC99M TETROFOSMIN: HCPCS | Performed by: FAMILY MEDICINE

## 2018-12-03 RX ORDER — SODIUM CHLORIDE 0.9 % (FLUSH) 0.9 %
10 SYRINGE (ML) INJECTION PRN
Status: DISCONTINUED | OUTPATIENT
Start: 2018-12-03 | End: 2018-12-06 | Stop reason: HOSPADM

## 2018-12-03 RX ADMIN — Medication 10 ML: at 07:52

## 2018-12-03 RX ADMIN — Medication 10 ML: at 10:21

## 2018-12-03 RX ADMIN — TETROFOSMIN 32.9 MILLICURIE: 0.23 INJECTION, POWDER, LYOPHILIZED, FOR SOLUTION INTRAVENOUS at 10:20

## 2018-12-03 RX ADMIN — TETROFOSMIN 11.6 MILLICURIE: 0.23 INJECTION, POWDER, LYOPHILIZED, FOR SOLUTION INTRAVENOUS at 07:51

## 2019-02-19 DIAGNOSIS — E78.2 MIXED HYPERLIPIDEMIA: ICD-10-CM

## 2019-02-19 DIAGNOSIS — E11.65 CONTROLLED TYPE 2 DIABETES MELLITUS WITH HYPERGLYCEMIA, WITHOUT LONG-TERM CURRENT USE OF INSULIN (HCC): ICD-10-CM

## 2019-02-19 LAB
ALBUMIN SERPL-MCNC: 4.5 G/DL (ref 3.5–4.6)
ALP BLD-CCNC: 86 U/L (ref 35–104)
ALT SERPL-CCNC: 40 U/L (ref 0–41)
AST SERPL-CCNC: 31 U/L (ref 0–40)
BILIRUB SERPL-MCNC: 0.4 MG/DL (ref 0.2–0.7)
BILIRUBIN DIRECT: <0.2 MG/DL (ref 0–0.4)
BILIRUBIN, INDIRECT: NORMAL MG/DL (ref 0–0.6)
CREATININE URINE: 180.4 MG/DL
HBA1C MFR BLD: 8.6 % (ref 4.8–5.9)
LIPASE: 48 U/L (ref 12–95)
MICROALBUMIN UR-MCNC: <1.2 MG/DL
MICROALBUMIN/CREAT UR-RTO: NORMAL MG/G (ref 0–30)
TOTAL PROTEIN: 7.8 G/DL (ref 6.3–8)

## 2019-02-21 ENCOUNTER — OFFICE VISIT (OUTPATIENT)
Dept: FAMILY MEDICINE CLINIC | Age: 59
End: 2019-02-21
Payer: COMMERCIAL

## 2019-02-21 VITALS
HEIGHT: 68 IN | OXYGEN SATURATION: 95 % | HEART RATE: 67 BPM | SYSTOLIC BLOOD PRESSURE: 128 MMHG | TEMPERATURE: 97.5 F | WEIGHT: 263.8 LBS | DIASTOLIC BLOOD PRESSURE: 84 MMHG | BODY MASS INDEX: 39.98 KG/M2

## 2019-02-21 DIAGNOSIS — E11.65 CONTROLLED TYPE 2 DIABETES MELLITUS WITH HYPERGLYCEMIA, WITHOUT LONG-TERM CURRENT USE OF INSULIN (HCC): Primary | ICD-10-CM

## 2019-02-21 DIAGNOSIS — R74.8 ELEVATED LIVER ENZYMES: ICD-10-CM

## 2019-02-21 DIAGNOSIS — E78.2 MIXED HYPERLIPIDEMIA: ICD-10-CM

## 2019-02-21 DIAGNOSIS — I10 ESSENTIAL HYPERTENSION: ICD-10-CM

## 2019-02-21 PROCEDURE — 99214 OFFICE O/P EST MOD 30 MIN: CPT | Performed by: FAMILY MEDICINE

## 2019-02-21 ASSESSMENT — ENCOUNTER SYMPTOMS
BLOOD IN STOOL: 0
COUGH: 0
CHEST TIGHTNESS: 0
DIARRHEA: 0
ABDOMINAL PAIN: 0
COLOR CHANGE: 0
VOICE CHANGE: 0
WHEEZING: 0
TROUBLE SWALLOWING: 0
NAUSEA: 0
VOMITING: 0
RHINORRHEA: 0
CONSTIPATION: 0
SORE THROAT: 0
SINUS PAIN: 0
SHORTNESS OF BREATH: 0

## 2019-02-21 ASSESSMENT — PATIENT HEALTH QUESTIONNAIRE - PHQ9
SUM OF ALL RESPONSES TO PHQ9 QUESTIONS 1 & 2: 0
1. LITTLE INTEREST OR PLEASURE IN DOING THINGS: 0
2. FEELING DOWN, DEPRESSED OR HOPELESS: 0
SUM OF ALL RESPONSES TO PHQ QUESTIONS 1-9: 0
SUM OF ALL RESPONSES TO PHQ QUESTIONS 1-9: 0

## 2019-02-26 ENCOUNTER — TELEPHONE (OUTPATIENT)
Dept: FAMILY MEDICINE CLINIC | Age: 59
End: 2019-02-26

## 2023-04-17 PROBLEM — Z20.822 SUSPECTED COVID-19 VIRUS INFECTION: Status: ACTIVE | Noted: 2023-04-17

## 2023-04-17 PROBLEM — E11.65 CONTROLLED TYPE 2 DIABETES MELLITUS WITH HYPERGLYCEMIA, WITHOUT LONG-TERM CURRENT USE OF INSULIN (MULTI): Status: ACTIVE | Noted: 2023-04-17

## 2023-04-17 PROBLEM — I10 HTN (HYPERTENSION): Status: ACTIVE | Noted: 2023-04-17

## 2023-04-17 PROBLEM — J01.40 ACUTE NON-RECURRENT PANSINUSITIS: Status: ACTIVE | Noted: 2023-04-17

## 2023-04-17 PROBLEM — J06.9 URI (UPPER RESPIRATORY INFECTION): Status: ACTIVE | Noted: 2023-04-17

## 2023-04-17 PROBLEM — J34.89 NASAL CONGESTION WITH RHINORRHEA: Status: ACTIVE | Noted: 2023-04-17

## 2023-04-17 PROBLEM — R01.1 SYSTOLIC EJECTION MURMUR: Status: ACTIVE | Noted: 2023-04-17

## 2023-04-17 PROBLEM — J00 NASOPHARYNGITIS: Status: ACTIVE | Noted: 2023-04-17

## 2023-04-17 PROBLEM — R06.2 WHEEZING: Status: ACTIVE | Noted: 2023-04-17

## 2023-04-17 PROBLEM — Z96.659 HISTORY OF TOTAL KNEE REPLACEMENT: Status: ACTIVE | Noted: 2023-04-17

## 2023-04-17 PROBLEM — J20.9 ACUTE BRONCHITIS: Status: ACTIVE | Noted: 2023-04-17

## 2023-04-17 PROBLEM — R05.3 PERSISTENT COUGH: Status: ACTIVE | Noted: 2023-04-17

## 2023-04-17 PROBLEM — I45.10 COMPLETE RIGHT BUNDLE BRANCH BLOCK (RBBB): Status: ACTIVE | Noted: 2023-04-17

## 2023-04-17 PROBLEM — M17.0 PRIMARY OSTEOARTHRITIS OF BOTH KNEES: Status: ACTIVE | Noted: 2023-04-17

## 2023-04-17 PROBLEM — L25.9 CONTACT DERMATITIS: Status: ACTIVE | Noted: 2023-04-17

## 2023-04-17 PROBLEM — R09.81 NASAL CONGESTION WITH RHINORRHEA: Status: ACTIVE | Noted: 2023-04-17

## 2023-04-17 PROBLEM — E78.5 HYPERLIPIDEMIA: Status: ACTIVE | Noted: 2023-04-17

## 2023-04-17 PROBLEM — E11.9 ENCOUNTER FOR DIABETIC FOOT EXAM (MULTI): Status: ACTIVE | Noted: 2023-04-17

## 2023-04-17 PROBLEM — Q70.9 SYNDACTYLY OF TOES OF BOTH FEET: Status: ACTIVE | Noted: 2023-04-17

## 2023-04-17 RX ORDER — ALBUTEROL SULFATE 90 UG/1
AEROSOL, METERED RESPIRATORY (INHALATION)
COMMUNITY
Start: 2022-10-27 | End: 2023-10-26 | Stop reason: ALTCHOICE

## 2023-04-17 RX ORDER — BLOOD SUGAR DIAGNOSTIC
STRIP MISCELLANEOUS
COMMUNITY
Start: 2018-06-24

## 2023-04-17 RX ORDER — LISINOPRIL 10 MG/1
1 TABLET ORAL DAILY
COMMUNITY
Start: 2022-10-27 | End: 2023-09-13 | Stop reason: SDUPTHER

## 2023-04-17 RX ORDER — DAPAGLIFLOZIN 10 MG/1
1 TABLET, FILM COATED ORAL
COMMUNITY
Start: 2022-02-09 | End: 2023-07-19 | Stop reason: SDUPTHER

## 2023-04-17 RX ORDER — GEMFIBROZIL 600 MG/1
1 TABLET, FILM COATED ORAL 2 TIMES DAILY
COMMUNITY
Start: 2019-08-29 | End: 2023-10-17 | Stop reason: SDUPTHER

## 2023-04-17 RX ORDER — ZINC GLUCONATE 50 MG
1 TABLET ORAL DAILY
COMMUNITY
Start: 2022-09-14 | End: 2023-10-26 | Stop reason: ALTCHOICE

## 2023-04-17 RX ORDER — ASPIRIN 81 MG/1
TABLET ORAL
COMMUNITY
Start: 2022-09-13 | End: 2024-02-27 | Stop reason: ALTCHOICE

## 2023-04-17 RX ORDER — METFORMIN HYDROCHLORIDE 500 MG/1
2 TABLET ORAL
COMMUNITY
Start: 2019-07-03 | End: 2023-10-17 | Stop reason: SDUPTHER

## 2023-04-17 RX ORDER — OXYCODONE AND ACETAMINOPHEN 5; 325 MG/1; MG/1
TABLET ORAL
COMMUNITY
Start: 2022-09-13 | End: 2023-10-26 | Stop reason: ALTCHOICE

## 2023-04-17 RX ORDER — CLINDAMYCIN HYDROCHLORIDE 150 MG/1
CAPSULE ORAL
COMMUNITY
Start: 2023-02-15 | End: 2023-10-26 | Stop reason: ALTCHOICE

## 2023-04-17 RX ORDER — AZITHROMYCIN 500 MG/1
1 TABLET, FILM COATED ORAL DAILY
COMMUNITY
Start: 2022-10-27 | End: 2023-10-26 | Stop reason: ALTCHOICE

## 2023-04-17 RX ORDER — PREDNISONE 10 MG/1
TABLET ORAL
COMMUNITY
Start: 2022-10-27 | End: 2023-10-26 | Stop reason: ALTCHOICE

## 2023-04-17 RX ORDER — EZETIMIBE 10 MG/1
1 TABLET ORAL DAILY
COMMUNITY
Start: 2019-06-19 | End: 2023-10-26 | Stop reason: SDUPTHER

## 2023-04-24 ENCOUNTER — APPOINTMENT (OUTPATIENT)
Dept: PRIMARY CARE | Facility: CLINIC | Age: 63
End: 2023-04-24

## 2023-05-01 ENCOUNTER — APPOINTMENT (OUTPATIENT)
Dept: PRIMARY CARE | Facility: CLINIC | Age: 63
End: 2023-05-01

## 2023-07-19 DIAGNOSIS — E11.65 CONTROLLED TYPE 2 DIABETES MELLITUS WITH HYPERGLYCEMIA, WITHOUT LONG-TERM CURRENT USE OF INSULIN (MULTI): ICD-10-CM

## 2023-07-19 RX ORDER — DAPAGLIFLOZIN 10 MG/1
10 TABLET, FILM COATED ORAL
Qty: 90 TABLET | Refills: 3 | Status: SHIPPED | OUTPATIENT
Start: 2023-07-19

## 2023-07-19 NOTE — TELEPHONE ENCOUNTER
Received a fax from [address] requesting refill for the patient's prescription of Farxiga. Medication has been pended to the provider for approval.     LV: 1/24/23  NV: none

## 2023-09-13 DIAGNOSIS — I10 HYPERTENSION, UNSPECIFIED TYPE: ICD-10-CM

## 2023-09-13 RX ORDER — LISINOPRIL 10 MG/1
10 TABLET ORAL DAILY
Qty: 30 TABLET | Refills: 3 | Status: SHIPPED | OUTPATIENT
Start: 2023-09-13 | End: 2024-01-22 | Stop reason: SDUPTHER

## 2023-09-13 NOTE — TELEPHONE ENCOUNTER
Rx Refill Request     Name: Tucker Holguin  :  1960   Medication Name:  lisinopril  Specific Pharmacy location:  [facility]   Date of last appointment:  2023  Date of next appointment:  Visit date not found   Best number to reach patient:  [phone]        Rx pending for approval/deny

## 2023-10-11 DIAGNOSIS — E78.5 HYPERLIPIDEMIA, UNSPECIFIED HYPERLIPIDEMIA TYPE: Primary | ICD-10-CM

## 2023-10-11 DIAGNOSIS — E11.65 CONTROLLED TYPE 2 DIABETES MELLITUS WITH HYPERGLYCEMIA, WITHOUT LONG-TERM CURRENT USE OF INSULIN (MULTI): ICD-10-CM

## 2023-10-11 NOTE — TELEPHONE ENCOUNTER
Rx Refill Request     Name: Tucker Holguin  :  1960   Medication Name:  GEMFIBROZIL 600mg tab, METFORMIN 500MG,   Specific Pharmacy location:  [facility]   Date of last appointment:  Visit date not found   Date of next appointment:  Visit date not found   Best number to reach patient:  [phone]   Patient will need to schedule an appt before refills.

## 2023-10-17 RX ORDER — METFORMIN HYDROCHLORIDE 500 MG/1
1000 TABLET ORAL
Qty: 120 TABLET | Refills: 5 | Status: SHIPPED | OUTPATIENT
Start: 2023-10-17 | End: 2024-04-24 | Stop reason: SDUPTHER

## 2023-10-17 RX ORDER — ONDANSETRON 4 MG/1
TABLET, ORALLY DISINTEGRATING ORAL EVERY 8 HOURS PRN
COMMUNITY
Start: 2023-09-28 | End: 2023-10-26 | Stop reason: ALTCHOICE

## 2023-10-17 RX ORDER — POLYETHYLENE GLYCOL 3350 17 G/17G
17 POWDER, FOR SOLUTION ORAL
COMMUNITY
Start: 2023-09-28 | End: 2023-10-26 | Stop reason: ALTCHOICE

## 2023-10-17 RX ORDER — GEMFIBROZIL 600 MG/1
600 TABLET, FILM COATED ORAL 2 TIMES DAILY
Qty: 60 TABLET | Refills: 5 | Status: SHIPPED | OUTPATIENT
Start: 2023-10-17 | End: 2024-04-24 | Stop reason: SDUPTHER

## 2023-10-17 NOTE — TELEPHONE ENCOUNTER
Patient returned call and scheduled a follow up.     Lov 1/24/23  Future appointment 10/26 at 430 pm

## 2023-10-19 PROBLEM — R06.02 SHORTNESS OF BREATH ON EXERTION: Status: ACTIVE | Noted: 2023-10-19

## 2023-10-19 PROBLEM — M17.11 OSTEOARTHRITIS OF RIGHT KNEE: Status: ACTIVE | Noted: 2023-10-19

## 2023-10-19 PROBLEM — M17.12 PRIMARY OSTEOARTHRITIS OF LEFT KNEE: Status: ACTIVE | Noted: 2023-10-19

## 2023-10-19 PROBLEM — K57.92 ACUTE DIVERTICULITIS: Status: ACTIVE | Noted: 2023-10-19

## 2023-10-26 ENCOUNTER — OFFICE VISIT (OUTPATIENT)
Dept: PRIMARY CARE | Facility: CLINIC | Age: 63
End: 2023-10-26
Payer: COMMERCIAL

## 2023-10-26 VITALS
OXYGEN SATURATION: 95 % | HEIGHT: 68 IN | WEIGHT: 250.2 LBS | HEART RATE: 63 BPM | TEMPERATURE: 97.2 F | DIASTOLIC BLOOD PRESSURE: 76 MMHG | RESPIRATION RATE: 16 BRPM | BODY MASS INDEX: 37.92 KG/M2 | SYSTOLIC BLOOD PRESSURE: 132 MMHG

## 2023-10-26 DIAGNOSIS — I10 BENIGN ESSENTIAL HTN: ICD-10-CM

## 2023-10-26 DIAGNOSIS — Z00.00 HEALTHCARE MAINTENANCE: Primary | ICD-10-CM

## 2023-10-26 DIAGNOSIS — R01.1 SYSTOLIC EJECTION MURMUR: ICD-10-CM

## 2023-10-26 DIAGNOSIS — Z12.11 SCREENING FOR MALIGNANT NEOPLASM OF COLON: ICD-10-CM

## 2023-10-26 DIAGNOSIS — E11.65 CONTROLLED TYPE 2 DIABETES MELLITUS WITH HYPERGLYCEMIA, WITHOUT LONG-TERM CURRENT USE OF INSULIN (MULTI): ICD-10-CM

## 2023-10-26 DIAGNOSIS — Z11.4 SCREENING FOR HIV WITHOUT PRESENCE OF RISK FACTORS: ICD-10-CM

## 2023-10-26 DIAGNOSIS — Z11.59 ENCOUNTER FOR HEPATITIS C SCREENING TEST FOR LOW RISK PATIENT: ICD-10-CM

## 2023-10-26 DIAGNOSIS — E78.2 MIXED HYPERLIPIDEMIA: ICD-10-CM

## 2023-10-26 DIAGNOSIS — Z12.5 SCREENING PSA (PROSTATE SPECIFIC ANTIGEN): ICD-10-CM

## 2023-10-26 DIAGNOSIS — Z23 NEED FOR IMMUNIZATION AGAINST INFLUENZA: ICD-10-CM

## 2023-10-26 DIAGNOSIS — I45.10 COMPLETE RIGHT BUNDLE BRANCH BLOCK (RBBB): ICD-10-CM

## 2023-10-26 DIAGNOSIS — E66.09 CLASS 2 OBESITY DUE TO EXCESS CALORIES WITHOUT SERIOUS COMORBIDITY WITH BODY MASS INDEX (BMI) OF 38.0 TO 38.9 IN ADULT: ICD-10-CM

## 2023-10-26 PROBLEM — J01.40 ACUTE NON-RECURRENT PANSINUSITIS: Status: RESOLVED | Noted: 2023-04-17 | Resolved: 2023-10-26

## 2023-10-26 PROBLEM — Z20.822 SUSPECTED COVID-19 VIRUS INFECTION: Status: RESOLVED | Noted: 2023-04-17 | Resolved: 2023-10-26

## 2023-10-26 PROBLEM — J34.89 NASAL CONGESTION WITH RHINORRHEA: Status: RESOLVED | Noted: 2023-04-17 | Resolved: 2023-10-26

## 2023-10-26 PROBLEM — J20.9 ACUTE BRONCHITIS: Status: RESOLVED | Noted: 2023-04-17 | Resolved: 2023-10-26

## 2023-10-26 PROBLEM — J06.9 URI (UPPER RESPIRATORY INFECTION): Status: RESOLVED | Noted: 2023-04-17 | Resolved: 2023-10-26

## 2023-10-26 PROBLEM — R09.81 NASAL CONGESTION WITH RHINORRHEA: Status: RESOLVED | Noted: 2023-04-17 | Resolved: 2023-10-26

## 2023-10-26 PROBLEM — J00 NASOPHARYNGITIS: Status: RESOLVED | Noted: 2023-04-17 | Resolved: 2023-10-26

## 2023-10-26 PROBLEM — E66.812 CLASS 2 OBESITY DUE TO EXCESS CALORIES WITHOUT SERIOUS COMORBIDITY WITH BODY MASS INDEX (BMI) OF 38.0 TO 38.9 IN ADULT: Status: ACTIVE | Noted: 2023-10-26

## 2023-10-26 PROCEDURE — 2028F FOOT EXAM PERFORMED: CPT | Performed by: FAMILY MEDICINE

## 2023-10-26 PROCEDURE — 90686 IIV4 VACC NO PRSV 0.5 ML IM: CPT | Performed by: FAMILY MEDICINE

## 2023-10-26 PROCEDURE — 3075F SYST BP GE 130 - 139MM HG: CPT | Performed by: FAMILY MEDICINE

## 2023-10-26 PROCEDURE — 3051F HG A1C>EQUAL 7.0%<8.0%: CPT | Performed by: FAMILY MEDICINE

## 2023-10-26 PROCEDURE — 90471 IMMUNIZATION ADMIN: CPT | Performed by: FAMILY MEDICINE

## 2023-10-26 PROCEDURE — 99396 PREV VISIT EST AGE 40-64: CPT | Performed by: FAMILY MEDICINE

## 2023-10-26 PROCEDURE — 1036F TOBACCO NON-USER: CPT | Performed by: FAMILY MEDICINE

## 2023-10-26 PROCEDURE — 3008F BODY MASS INDEX DOCD: CPT | Performed by: FAMILY MEDICINE

## 2023-10-26 PROCEDURE — 4010F ACE/ARB THERAPY RXD/TAKEN: CPT | Performed by: FAMILY MEDICINE

## 2023-10-26 PROCEDURE — 3078F DIAST BP <80 MM HG: CPT | Performed by: FAMILY MEDICINE

## 2023-10-26 RX ORDER — EZETIMIBE 10 MG/1
10 TABLET ORAL DAILY
Qty: 90 TABLET | Refills: 3 | Status: SHIPPED | OUTPATIENT
Start: 2023-10-26

## 2023-10-26 ASSESSMENT — PATIENT HEALTH QUESTIONNAIRE - PHQ9
2. FEELING DOWN, DEPRESSED OR HOPELESS: NOT AT ALL
SUM OF ALL RESPONSES TO PHQ9 QUESTIONS 1 & 2: 0
1. LITTLE INTEREST OR PLEASURE IN DOING THINGS: NOT AT ALL

## 2023-10-26 NOTE — PROGRESS NOTES
"Subjective   Patient ID: Tucker Holguin is a 63 y.o. male who presents for Diabetes, Hypertension, and Hyperlipidemia.  I last saw the patient on 1/24/23.     HPI   Patient has no medical complaints today.     He notes that his BS has been good. The only time he noticed increased BS was when he was on steroids.     He mentions that he was cleared by Dr. Marie last month from his knee replacement.     Review of Systems  Except positives as noted in the CC & HPI      Constitutional: Denies fevers, chills, night sweats, fatigue, weight changes, change in appetite    Eyes: Denies blurry vision, double vision    ENT: Denies otalgia, trouble hearing, tinnitus, vertigo, nasal congestion, rhinorrhea, sore throat    Neck: Denies swelling, masses    Cardiovascular: Denies chest pain, palpitations, edema, orthopnea, syncope    Respiratory: Denies dyspnea, cough, wheezing, postural nocturnal dyspnea    Gastrointestinal: Denies abdominal pain, nausea, vomiting, diarrhea, constipation, melena, hematochezia    Genitourinary: Denies dysuria, hematuria, frequency, urgency    Musculoskeletal: Denies back pain, neck pain, arthralgias, myalgias    Integumentary: Denies skin lesions, rashes, masses    Neurological: Denies dizziness, headaches, confusion, limb weakness, paresthesias, syncope, convulsions    Psychiatric: Denies depression, anxiety, homicidal ideations, suicidal ideations, sleep disturbances    Endocrine: Denies polyphagia, polydipsia, polyuria, weakness, hair thinning, heat intolerance, cold intolerance, weight changes    Heme/Lymph: Denies easy bruising, easy bleeding, swollen glands    Objective   /76 (BP Location: Right arm, Patient Position: Sitting)   Pulse 63   Temp 36.2 °C (97.2 °F)   Resp 16   Ht 1.727 m (5' 8\")   Wt 113 kg (250 lb 3.2 oz)   SpO2 95%   BMI 38.04 kg/m²     Physical Exam  Feet:      Right foot:      Protective Sensation: 10 sites tested.  10 sites sensed.      Skin integrity: No " callus.      Left foot:      Protective Sensation: 10 sites tested.  10 sites sensed.      Skin integrity: No callus.      Comments: Diabetic foot exam done today.   Partial fusion between the 2nd and 3rd toes, bilaterally.     132/76 on recheck of BP in the right arm.     General Appearance - well-developed, well-nourished, 63 y.o., White male in no acute distress.     Skin - warm, pink and dry without rash or concerning lesions.     Mental Status - alert and oriented x 3. Normal mood and affect appropriate to mood.     Ears - TMs shiny and move well with insufflation. Ear canals are clear bilaterally.     Neck - supple without lymphadenopathy. Carotid pulses are normal without bruits. Thyroid is normal in midline without nodules.     Chest - lungs are clear to auscultation without rales, rhonchi or wheezes.     Heart - regular, rate and rhythm without murmurs, rubs or gallops. Grade 1/6 systolic ejection murmur hears best at the right sternal border, 2nd ICS.     Abdomen - soft, obese, protuberant, nontender, nondistended. No masses, hepatomegaly or splenomegaly is noted. No rebound, rigidity or guarding is noted. Bowel sounds are normoactive.    Extremities - no cyanosis, clubbing or edema. Pedal pulses are 2+ normal at the dorsalis pedis and posterior pulses bilaterally.     Neurological - cranial nerves II through XII are grossly intact. Motor strength 5/5 at all fours.     Assessment/Plan   1. Healthcare maintenance        2. Controlled type 2 diabetes mellitus with hyperglycemia, without long-term current use of insulin (CMS/[facility])  Hemoglobin A1C    Albumin , Urine Random      3. Benign essential HTN        4. Mixed hyperlipidemia  ezetimibe (Zetia) 10 mg tablet    Lipid Panel      5. Complete right bundle branch block (RBBB)        6. Systolic ejection murmur        7. Class 2 obesity due to excess calories without serious comorbidity with body mass index (BMI) of 38.0 to 38.9 in adult        8. Screening PSA  (prostate specific antigen)  Prostate Specific Antigen, Screen      9. Need for immunization against influenza  Flu vaccine (IIV4) age 6 months and greater, preservative free      10. Screening for malignant neoplasm of colon  Colonoscopy Screening; Average Risk Patient      11. Screening for HIV without presence of risk factors        12. Encounter for hepatitis C screening test for low risk patient        Patient will continue on a diabetic, low-cholesterol diet and weight reduction. Exercise as tolerated. He will continue medications as prescribed. Follow-up in 6 month(s) otherwise as needed.      Patient is to return for fasting A1c, lipid panel, urine albumin, PSA, Hep C, HIV labs at their convenience prior to his next appointment. Fasting is nothing to eat or drink except water or black coffee for 8-12 hours. Will call patient with results when available.     Patient was given refill(s) on:   Ezetimibe 10 mg, TAKE 1 TAB BY MOUTH DAILY    Rx(s) sent to pharmacy.     Flu vaccine given in the office today.     Colonoscopy was ordered to screen for colorectal cancer.       Scribe Attestation  By signing my name below, IShalini Scribe   attest that this documentation has been prepared under the direction and in the presence of Darian Munguia MD.

## 2024-01-22 DIAGNOSIS — I10 HYPERTENSION, UNSPECIFIED TYPE: ICD-10-CM

## 2024-01-22 RX ORDER — LISINOPRIL 10 MG/1
10 TABLET ORAL DAILY
Qty: 30 TABLET | Refills: 5 | Status: SHIPPED | OUTPATIENT
Start: 2024-01-22 | End: 2024-05-13 | Stop reason: SDUPTHER

## 2024-01-22 NOTE — TELEPHONE ENCOUNTER
Rx Refill Request     Name: Tucker Holguin  :  1960   Medication Name:  lisinopril 10mg tab   Specific [facility]   Date of last appointment:  10/26/2023  Date of next appointment:  2024  Best number to reach patient:  [phone]

## 2024-01-24 ENCOUNTER — OFFICE VISIT (OUTPATIENT)
Dept: PRIMARY CARE | Facility: CLINIC | Age: 64
End: 2024-01-24
Payer: COMMERCIAL

## 2024-01-24 VITALS
BODY MASS INDEX: 39.92 KG/M2 | DIASTOLIC BLOOD PRESSURE: 79 MMHG | HEART RATE: 99 BPM | TEMPERATURE: 97.9 F | SYSTOLIC BLOOD PRESSURE: 129 MMHG | RESPIRATION RATE: 16 BRPM | HEIGHT: 68 IN | WEIGHT: 263.4 LBS | OXYGEN SATURATION: 95 %

## 2024-01-24 DIAGNOSIS — J02.9 PHARYNGITIS, UNSPECIFIED ETIOLOGY: Primary | ICD-10-CM

## 2024-01-24 DIAGNOSIS — R09.82 POST-NASAL DRIP: ICD-10-CM

## 2024-01-24 DIAGNOSIS — E66.01 CLASS 3 SEVERE OBESITY WITH SERIOUS COMORBIDITY AND BODY MASS INDEX (BMI) OF 40.0 TO 44.9 IN ADULT, UNSPECIFIED OBESITY TYPE (MULTI): ICD-10-CM

## 2024-01-24 DIAGNOSIS — R05.9 COUGH IN ADULT PATIENT: ICD-10-CM

## 2024-01-24 PROCEDURE — 99213 OFFICE O/P EST LOW 20 MIN: CPT | Performed by: NURSE PRACTITIONER

## 2024-01-24 RX ORDER — AZITHROMYCIN 500 MG/1
500 TABLET, FILM COATED ORAL DAILY
Qty: 7 TABLET | Refills: 0 | Status: SHIPPED | OUTPATIENT
Start: 2024-01-24 | End: 2024-01-31

## 2024-01-24 RX ORDER — BENZONATATE 200 MG/1
200 CAPSULE ORAL 3 TIMES DAILY PRN
Qty: 20 CAPSULE | Refills: 0 | Status: SHIPPED | OUTPATIENT
Start: 2024-01-24 | End: 2024-01-31

## 2024-01-24 ASSESSMENT — PATIENT HEALTH QUESTIONNAIRE - PHQ9
2. FEELING DOWN, DEPRESSED OR HOPELESS: NOT AT ALL
1. LITTLE INTEREST OR PLEASURE IN DOING THINGS: NOT AT ALL
SUM OF ALL RESPONSES TO PHQ9 QUESTIONS 1 AND 2: 0
SUM OF ALL RESPONSES TO PHQ9 QUESTIONS 1 AND 2: 0
1. LITTLE INTEREST OR PLEASURE IN DOING THINGS: NOT AT ALL
2. FEELING DOWN, DEPRESSED OR HOPELESS: NOT AT ALL

## 2024-01-24 ASSESSMENT — ENCOUNTER SYMPTOMS
LOSS OF SENSATION IN FEET: 0
DEPRESSION: 0
OCCASIONAL FEELINGS OF UNSTEADINESS: 0

## 2024-01-24 NOTE — PATIENT INSTRUCTIONS
DISCHARGE SUMMARY:   Patient was seen and examined. Diagnosis, treatment, treatment options, and possible complications of today's illness discussed and explained to patient. Patient to take medication/s associated with this visit. Patient may take OTC decongestant of choice as needed. Patient may also take OTC analgesic/antipyretic if needed for pain/fever. Advised to increase oral fluid intake. Advised steam inhalation if needed to relieve congestion. Advised warm saline gargle if needed to relieve throat discomfort. Advised Listerine antiseptic mouthwash gargle TID. Patient may use Cepacol oral spray as needed to relieve throat discomfort. Patient was advised to discard the old toothbrush and use a new toothbrush beginning on the third of antibiotics. Advised to come back if with worsening or persistent symptoms. Patient verbalized understanding of plan of care.    Patient to come back in 7 - 10 days if needed for worsening symptoms.

## 2024-01-24 NOTE — PROGRESS NOTES
"Subjective   Patient ID: Tucker Holguin is a 63 y.o. male who presents for URI.    HPI     Symptoms: cough, sore throat, chest pain from the cough.  Length of symptoms: sx started 1/19/2024  OTC: pt took nyquil and dayquil, vaporub vicks with mild help.  Related information: pt did at home COVID test  1/23/204 come back negative.    Review of Systems    Objective   /79 Comment: automated  Pulse 76   Temp 36.6 °C (97.9 °F) (Temporal)   Resp 16   Ht 1.727 m (5' 8\")   Wt 119 kg (263 lb 6.4 oz)   SpO2 95%   BMI 40.05 kg/m²     Physical Exam    Assessment/Plan          "

## 2024-01-24 NOTE — PROGRESS NOTES
Subjective   Patient ID: Tucker Holguin is a 63 y.o. male who is with a chief complaint of symptoms of respiratory tract infection.     HPI  Patient is a 63 y.o. male who CONSULTED AT [facility] today. Patient is with complaints of cough, intermittent shortness of breath, throat irritation, post nasal drip, and mild fatigue. He denies having any nasal congestion, nasal discharge, headache / sinus pain, muscle ache, loss of sense of taste, loss of sense of smell, diarrhea, chills nor fever. Patient states that present condition started about 6 days ago. Patient denies history of recent travel, exposure to person/people who tested positive for COVID 19, nor exposure to person/people with flu like symptoms. he denies shortness of breath at present, chest pain, palpitations, nor edema. he stated that he  tried OTC medications which afforded only slight relief of symptoms. he denies nausea, vomiting, abdominal pain, nor any other symptoms.    Patient states he had his COVID vaccine.  Patient states he had the flu shot for this season.    Patient states he underwent testing for COVID about 2 days ago, and the result was NEGATIVE.     Review of Systems  General: no weight loss, generally healthy, (+) mild fatigue  Head:  no headaches / sinus pain, no vertigo, no injury  Eyes: no diplopia, no tearing, no pain,   Ears: no change in hearing, no tinnitus, no bleeding, no vertigo  Mouth:  no dental difficulties, no gingival bleeding, (+) throat irritation, no loss of sense of taste, (+) post nasal drip,   Nose: no congestion, no  discharge, no bleeding, no obstruction, no loss of sense of smell  Neck: no stiffness, no pain, no tenderness, no masses, no bruit  Pulmonary: (+) intermittent dyspnea, no wheezing, no hemoptysis, (+) cough  Cardiovascular: no chest pain, no palpitations, no syncope, no orthopnea  Gastrointestinal: no change in appetite, no dysphagia, no abdominal pains, no diarrhea, no emesis,  no melena  Genito Urinary: no dysuria, no urinary urgency, no nocturia, no incontinence, no change in nature of urine  Musculoskeletal: no muscle ache, no joint pain, no limitation of range of motion, no paresthesia, no numbness  Constitutional: no fever, no chills, no night sweats    Objective   Physical Exam  General: ambulatory, in no acute distress  Head: normocephalic, no lesions, no sinus tenderness  Eyes: pink palpebral conjunctiva, anicteric sclerae, PERRLA, EOM's full  Ears: clear external auditory canals, no ear discharge, no bleeding from the ears, tympanic membrane intact  Nose: (+) congested nasal mucosa, (+) yellow mucoid nasal discharge, no bleeding, no obstruction  Throat: (+) erythema, and (+) exudate on posterior pharyngeal wall, no lesion  Neck: supple, no masses, no bruits, no CLADP  Chest: symmetrical chest expansion, no lagging, no retractions, clear breath sounds, no rales, no wheezes    Assessment/Plan   Problem List Items Addressed This Visit    None  Visit Diagnoses         Codes    Pharyngitis, unspecified etiology    -  Primary J02.9    Relevant Medications    azithromycin (Zithromax) 500 mg tablet    Cough in adult patient     R05.9    Relevant Medications    azithromycin (Zithromax) 500 mg tablet    benzonatate (Tessalon) 200 mg capsule    Post-nasal drip     R09.82    Relevant Medications    azithromycin (Zithromax) 500 mg tablet    BMI 40.0-44.9, adult (CMS/[facility])     Z68.41    Class 3 severe obesity with serious comorbidity and body mass index (BMI) of 40.0 to 44.9 in adult, unspecified obesity type (CMS/[facility])     E66.01, Z68.41        DISCHARGE SUMMARY:   Patient was seen and examined. Diagnosis, treatment, treatment options, and possible complications of today's illness discussed and explained to patient. Patient to take medication/s associated with this visit. Patient may take OTC decongestant of choice as needed. Patient may also take OTC analgesic/antipyretic if needed for  pain/fever. Advised to increase oral fluid intake. Advised steam inhalation if needed to relieve congestion. Advised warm saline gargle if needed to relieve throat discomfort. Advised Listerine antiseptic mouthwash gargle TID. Patient may use Cepacol oral spray as needed to relieve throat discomfort. Patient was advised to discard the old toothbrush and use a new toothbrush beginning on the third of antibiotics. Advised to come back if with worsening or persistent symptoms. Patient verbalized understanding of plan of care.    Patient to come back in 7 - 10 days if needed for worsening symptoms.           ERIKA Bailey-CNP 01/24/24 11:39 AM

## 2024-02-09 ENCOUNTER — ANESTHESIA EVENT (OUTPATIENT)
Dept: GASTROENTEROLOGY | Facility: EXTERNAL LOCATION | Age: 64
End: 2024-02-09

## 2024-02-27 ENCOUNTER — ANESTHESIA (OUTPATIENT)
Dept: GASTROENTEROLOGY | Facility: EXTERNAL LOCATION | Age: 64
End: 2024-02-27

## 2024-02-27 ENCOUNTER — HOSPITAL ENCOUNTER (OUTPATIENT)
Dept: GASTROENTEROLOGY | Facility: EXTERNAL LOCATION | Age: 64
Discharge: HOME | End: 2024-02-27
Payer: COMMERCIAL

## 2024-02-27 VITALS
OXYGEN SATURATION: 96 % | HEART RATE: 68 BPM | WEIGHT: 258 LBS | BODY MASS INDEX: 39.1 KG/M2 | TEMPERATURE: 97 F | DIASTOLIC BLOOD PRESSURE: 62 MMHG | RESPIRATION RATE: 17 BRPM | HEIGHT: 68 IN | SYSTOLIC BLOOD PRESSURE: 106 MMHG

## 2024-02-27 DIAGNOSIS — Z12.11 SCREENING FOR MALIGNANT NEOPLASM OF COLON: ICD-10-CM

## 2024-02-27 PROCEDURE — 88305 TISSUE EXAM BY PATHOLOGIST: CPT | Performed by: STUDENT IN AN ORGANIZED HEALTH CARE EDUCATION/TRAINING PROGRAM

## 2024-02-27 PROCEDURE — 45385 COLONOSCOPY W/LESION REMOVAL: CPT | Performed by: STUDENT IN AN ORGANIZED HEALTH CARE EDUCATION/TRAINING PROGRAM

## 2024-02-27 PROCEDURE — 88305 TISSUE EXAM BY PATHOLOGIST: CPT | Mod: TC | Performed by: STUDENT IN AN ORGANIZED HEALTH CARE EDUCATION/TRAINING PROGRAM

## 2024-02-27 RX ORDER — PROPOFOL 10 MG/ML
INJECTION, EMULSION INTRAVENOUS AS NEEDED
Status: DISCONTINUED | OUTPATIENT
Start: 2024-02-27 | End: 2024-02-27

## 2024-02-27 RX ORDER — SODIUM CHLORIDE 9 MG/ML
20 INJECTION, SOLUTION INTRAVENOUS CONTINUOUS
Status: DISCONTINUED | OUTPATIENT
Start: 2024-02-27 | End: 2024-02-28 | Stop reason: HOSPADM

## 2024-02-27 RX ORDER — SODIUM CHLORIDE 9 MG/ML
INJECTION, SOLUTION INTRAVENOUS CONTINUOUS PRN
Status: DISCONTINUED | OUTPATIENT
Start: 2024-02-27 | End: 2024-02-27

## 2024-02-27 RX ADMIN — PROPOFOL 50 MG: 10 INJECTION, EMULSION INTRAVENOUS at 08:40

## 2024-02-27 RX ADMIN — PROPOFOL 50 MG: 10 INJECTION, EMULSION INTRAVENOUS at 08:46

## 2024-02-27 RX ADMIN — PROPOFOL 150 MG: 10 INJECTION, EMULSION INTRAVENOUS at 08:33

## 2024-02-27 RX ADMIN — SODIUM CHLORIDE: 9 INJECTION, SOLUTION INTRAVENOUS at 08:30

## 2024-02-27 RX ADMIN — PROPOFOL 50 MG: 10 INJECTION, EMULSION INTRAVENOUS at 08:50

## 2024-02-27 SDOH — HEALTH STABILITY: MENTAL HEALTH: CURRENT SMOKER: 0

## 2024-02-27 ASSESSMENT — COLUMBIA-SUICIDE SEVERITY RATING SCALE - C-SSRS
1. IN THE PAST MONTH, HAVE YOU WISHED YOU WERE DEAD OR WISHED YOU COULD GO TO SLEEP AND NOT WAKE UP?: NO
2. HAVE YOU ACTUALLY HAD ANY THOUGHTS OF KILLING YOURSELF?: NO
6. HAVE YOU EVER DONE ANYTHING, STARTED TO DO ANYTHING, OR PREPARED TO DO ANYTHING TO END YOUR LIFE?: NO

## 2024-02-27 ASSESSMENT — PAIN - FUNCTIONAL ASSESSMENT
PAIN_FUNCTIONAL_ASSESSMENT: 0-10

## 2024-02-27 ASSESSMENT — PAIN SCALES - GENERAL
PAINLEVEL_OUTOF10: 0 - NO PAIN
PAIN_LEVEL: 0
PAINLEVEL_OUTOF10: 0 - NO PAIN
PAINLEVEL_OUTOF10: 0 - NO PAIN

## 2024-02-27 NOTE — DISCHARGE INSTRUCTIONS
Patient Instructions Post Procedure      The anesthetics, sedatives or narcotics which were given to you today will be acting in your body for the next 24 hours, so you might feel a little sleepy or groggy.  This feeling should slowly wear off. Carefully read and follow the instructions.     You received sedation today:  - Do not drive or operate any machinery or power tools of any kind.   - No alcoholic beverages today, not even beer or wine.  - Do not make any important decisions or sign any legal documents.  - No over the counter medications that contain alcohol or that may cause drowsiness.    While it is common to experience mild to moderate abdominal distention, gas, or belching after your procedure, if any of these symptoms occur following discharge from the GI Lab or within one week of having your procedure, call the [facility] to be advised whether a visit to your nearest Urgent Care or Emergency Department is indicated.  Take this paper with you if you go.   - If you develop an allergic reaction to the medications that were given during your procedure such as difficulty breathing, rash, hives, severe nausea, vomiting or lightheadedness.  - If you experience chest pain, shortness of breath, severe abdominal pain, fevers and chills.  -If you develop signs and symptoms of bleeding such as blood in your spit, if your stools turn black, tarry, or bloody  - If you have not urinated within 8 hours following your procedure.  - If your IV site becomes painful, red, inflamed, or looks infected.    Your physician recommends the additional following instructions:    -You have a contact number available for emergencies. The signs and symptoms of potential delayed complications were discussed with you. You may return to normal activities tomorrow.  -Resume your previous diet or other if specified.  -Continue your present medications.   -We are waiting for your pathology results, if applicable.  -The  findings and recommendations have been discussed with you and/or family.  - Please see Medication Reconciliation Form for new medication/medications prescribed.     If you experience any problems or have any questions following discharge from the GI Lab, please call: [phone] from 7 am- 4:30 pm.  In the event of an emergency please go to the closest Emergency Department or call Dr. Wakefield [phone]

## 2024-02-27 NOTE — ANESTHESIA POSTPROCEDURE EVALUATION
Patient: Tucker Holguin    Procedure Summary       Date: 02/27/24 Room / Location: [facility]    Anesthesia Start: 0829 Anesthesia Stop: 0859    Procedure: COLONOSCOPY Diagnosis: Screening for malignant neoplasm of colon    Scheduled Providers: Edward Wakefield DO; Kierra Quijano RN; Tristian Leung MA; SIDRA Talamantes Responsible Provider: SIDRA Talamantes    Anesthesia Type: MAC ASA Status: 3            Anesthesia Type: MAC    Vitals Value Taken Time   /67 02/27/24 0856   Temp 36.1 °C (97 °F) 02/27/24 0856   Pulse 64 02/27/24 0856   Resp 20 02/27/24 0856   SpO2 96 % 02/27/24 0856       Anesthesia Post Evaluation    Patient location during evaluation: PACU  Patient participation: complete - patient participated  Level of consciousness: sleepy but conscious  Pain score: 0  Pain management: adequate  Airway patency: patent  Cardiovascular status: acceptable  Respiratory status: acceptable  Hydration status: acceptable  Postoperative Nausea and Vomiting: none        No notable events documented.

## 2024-02-27 NOTE — ANESTHESIA PREPROCEDURE EVALUATION
Patient: Tucker Holguin    Procedure Information       Date/Time: 02/27/24 0830    Scheduled providers: Edward Wakefield DO; Kierra Quijano RN; Tristian Leung MA; ERIKA Talamantes-CRNA    Procedure: COLONOSCOPY    Location: [facility]            Relevant Problems   Cardiovascular   (+) Benign essential HTN   (+) Complete right bundle branch block (RBBB)   (+) Mixed hyperlipidemia   (+) Systolic ejection murmur      Endocrine   (+) Class 2 obesity due to excess calories without serious comorbidity with body mass index (BMI) of 38.0 to 38.9 in adult      Pulmonary   (+) Shortness of breath on exertion      Musculoskeletal   (+) Osteoarthritis of right knee   (+) Primary osteoarthritis of both knees   (+) Primary osteoarthritis of left knee       Clinical information reviewed:   Tobacco  Allergies  Meds   Med Hx  Surg Hx   Fam Hx  Soc Hx      Vitals:    02/27/24 0817   BP: 126/77   Pulse: 67   Resp: 18   Temp: 36 °C (96.8 °F)   SpO2: 97%       Past Surgical History:   Procedure Laterality Date    OTHER SURGICAL HISTORY  09/24/2020    Colonoscopy    OTHER SURGICAL HISTORY  09/24/2020    Eye surgery    OTHER SURGICAL HISTORY  06/01/2021    Knee arthroscopy    OTHER SURGICAL HISTORY  09/07/2022    Joint replacement procedure    OTHER SURGICAL HISTORY  03/11/2021    Umbilical hernia repair laparoscopic     Past Medical History:   Diagnosis Date    Diverticulitis     Personal history of diseases of the skin and subcutaneous tissue     History of cellulitis    Personal history of other diseases of the musculoskeletal system and connective tissue     History of arthritis    Personal history of other endocrine, nutritional and metabolic disease 08/22/2022    History of type 2 diabetes mellitus    Umbilical hernia without obstruction or gangrene 09/02/2021    Umbilical hernia without obstruction and without gangrene    Unspecified visual loss     Vision problems       Current Outpatient  Medications:     dapagliflozin propanediol (Farxiga) 10 mg, Take 1 tablet (10 mg) by mouth once daily in the morning. Take before meals., Disp: 90 tablet, Rfl: 3    ezetimibe (Zetia) 10 mg tablet, Take 1 tablet (10 mg) by mouth once daily., Disp: 90 tablet, Rfl: 3    gemfibrozil (Lopid) 600 mg tablet, Take 1 tablet (600 mg) by mouth 2 times a day., Disp: 60 tablet, Rfl: 5    lisinopril 10 mg tablet, Take 1 tablet (10 mg) by mouth once daily., Disp: 30 tablet, Rfl: 5    metFORMIN (Glucophage) 500 mg tablet, Take 2 tablets (1,000 mg) by mouth 2 times a day with meals., Disp: 120 tablet, Rfl: 5    blood sugar diagnostic (Accu-Chek Teetee Plus test strp) strip, TEST ONCE D, Disp: , Rfl:   Prior to Admission medications    Medication Sig Start Date End Date Taking? Authorizing Provider   dapagliflozin propanediol (Farxiga) 10 mg Take 1 tablet (10 mg) by mouth once daily in the morning. Take before meals. 7/19/23  Yes Darian Munguia MD   ezetimibe (Zetia) 10 mg tablet Take 1 tablet (10 mg) by mouth once daily. 10/26/23  Yes Darian Munguia MD   gemfibrozil (Lopid) 600 mg tablet Take 1 tablet (600 mg) by mouth 2 times a day. 10/17/23 4/14/24 Yes Darian Munguia MD   lisinopril 10 mg tablet Take 1 tablet (10 mg) by mouth once daily. 1/22/24  Yes Darian Munguia MD   metFORMIN (Glucophage) 500 mg tablet Take 2 tablets (1,000 mg) by mouth 2 times a day with meals. 10/17/23 4/14/24 Yes Darian Munguia MD   blood sugar diagnostic (Accu-Chek Teetee Plus test strp) strip TEST ONCE D 6/24/18   Historical Provider, MD   aspirin 81 mg EC tablet  9/13/22 2/27/24  Historical Provider, MD     Allergies   Allergen Reactions    Demeclocycline Hives    Penicillins Fever, Hallucinations and Hives     Social History     Tobacco Use    Smoking status: Never     Passive exposure: Never    Smokeless tobacco: Never   Substance Use Topics    Alcohol use: Never         Chemistry    Lab Results   Component Value Date/Time     (L)  09/28/2023 2111    K 4.0 09/28/2023 2111     09/28/2023 2111    CO2 23 09/28/2023 2111    BUN 16 09/28/2023 2111    CREATININE 1.00 09/28/2023 2111    Lab Results   Component Value Date/Time    CALCIUM 9.4 09/28/2023 2111    ALKPHOS 79 09/28/2023 2111    AST 17 09/28/2023 2111    ALT 29 09/28/2023 2111    BILITOT 0.4 09/28/2023 2111          Lab Results   Component Value Date/Time    WBC 9.4 09/28/2023 2111    HGB 13.7 09/28/2023 2111    HCT 41.1 09/28/2023 2111     09/28/2023 2111     Lab Results   Component Value Date/Time    PROTIME 11.4 08/30/2022 1142    INR 1.0 08/30/2022 1142     No results found for this or any previous visit (from the past 4464 hour(s)).      NPO Detail:  NPO/Void Status  Carbohydrate Drink Given Prior to Surgery? : N  Date of Last Liquid: 02/27/24  Time of Last Liquid: 0400  Date of Last Solid: 02/25/24  Time of Last Solid: 1800  Last Intake Type: Clear fluids  Time of Last Void: 0819         Physical Exam    Airway  Mallampati: II  TM distance: >3 FB  Neck ROM: full     Cardiovascular   Rhythm: regular  Rate: normal     Dental    Pulmonary   Breath sounds clear to auscultation     Abdominal   (+) obese  Abdomen: soft  Bowel sounds: normal           Anesthesia Plan    History of general anesthesia?: yes  History of complications of general anesthesia?: no    ASA 3     MAC     The patient is not a current smoker.  Patient was not previously instructed to abstain from smoking on day of procedure.  Education provided regarding risk of obstructive sleep apnea.  intravenous induction   Anesthetic plan and risks discussed with patient.    Plan discussed with CRNA.

## 2024-02-27 NOTE — H&P
Outpatient Hospital Procedure H&P    Patient Profile  Name Tucker Holguin  Date of Birth 1960  MRN [id number]  PCP Darian Munguia        Diagnosis: Colon cancer screening.  Procedure(s):  Colonoscopy.    Allergies  Allergies   Allergen Reactions    Demeclocycline Hives    Penicillins Fever, Hallucinations and Hives       Past Medical History   Past Medical History:   Diagnosis Date    Diverticulitis     Personal history of diseases of the skin and subcutaneous tissue     History of cellulitis    Personal history of other diseases of the musculoskeletal system and connective tissue     History of arthritis    Personal history of other endocrine, nutritional and metabolic disease 08/22/2022    History of type 2 diabetes mellitus    Umbilical hernia without obstruction or gangrene 09/02/2021    Umbilical hernia without obstruction and without gangrene    Unspecified visual loss     Vision problems       Medication Reviewed - yes  Prior to Admission medications    Medication Sig Start Date End Date Taking? Authorizing Provider   aspirin 81 mg EC tablet  9/13/22   Historical Provider, MD   blood sugar diagnostic (Accu-Chek Teetee Plus test strp) strip TEST ONCE D 6/24/18   Historical Provider, MD   dapagliflozin propanediol (Farxiga) 10 mg Take 1 tablet (10 mg) by mouth once daily in the morning. Take before meals. 7/19/23   Darian Munguia MD   ezetimibe (Zetia) 10 mg tablet Take 1 tablet (10 mg) by mouth once daily. 10/26/23   Darian Munguia MD   gemfibrozil (Lopid) 600 mg tablet Take 1 tablet (600 mg) by mouth 2 times a day. 10/17/23 4/14/24  Darian Munguia MD   lisinopril 10 mg tablet Take 1 tablet (10 mg) by mouth once daily. 1/22/24   Darian Munguia MD   metFORMIN (Glucophage) 500 mg tablet Take 2 tablets (1,000 mg) by mouth 2 times a day with meals. 10/17/23 4/14/24  Darian Munguia MD       Physical Exam  Vitals:    02/27/24 0817   BP: 126/77   Pulse: 67   Resp: 18   Temp: 36 °C (96.8 °F)    SpO2: 97%      Weight   Vitals:    02/27/24 0817   Weight: 117 kg (258 lb)     BMI Body mass index is 39.23 kg/m².    General: A&Ox3, NAD.  HEENT: AT/NC.   CV: RRR. No murmur.  Resp: CTA bilaterally. No wheezing, rhonchi or rales.   GI: Soft, NT/ND. BSx4.  Extrem: No edema. Pulses intact.  Skin: No Jaundice.   Neuro: No focal deficits.   Psych: Normal mood and affect.        Oropharyngeal Classification II (hard and soft palate, upper portion of tonsils anduvula visible)  ASA PS Classification 3  Sedation Plan: Deep Sedation.  Procedure Plan - pre-procedural (re)assesment completed by physician:  discharge/transfer patient when discharge criteria met    Edward Wakefield DO  2/27/2024 8:24 AM

## 2024-03-12 LAB
LABORATORY COMMENT REPORT: NORMAL
PATH REPORT.COMMENTS IMP SPEC: NORMAL
PATH REPORT.FINAL DX SPEC: NORMAL
PATH REPORT.GROSS SPEC: NORMAL
PATH REPORT.TOTAL CANCER: NORMAL

## 2024-04-18 ASSESSMENT — ENCOUNTER SYMPTOMS
POLYPHAGIA: 0
WEIGHT LOSS: 0
CONFUSION: 0
FATIGUE: 0
WEAKNESS: 0
SEIZURES: 0
BLURRED VISION: 0
NERVOUS/ANXIOUS: 0
DIZZINESS: 0
BLACKOUTS: 0
HUNGER: 0
POLYDIPSIA: 0
SWEATS: 0
TREMORS: 0
SPEECH DIFFICULTY: 0
HEADACHES: 0
VISUAL CHANGE: 0

## 2024-04-23 ENCOUNTER — APPOINTMENT (OUTPATIENT)
Dept: PRIMARY CARE | Facility: CLINIC | Age: 64
End: 2024-04-23
Payer: COMMERCIAL

## 2024-04-24 DIAGNOSIS — E78.5 HYPERLIPIDEMIA, UNSPECIFIED HYPERLIPIDEMIA TYPE: ICD-10-CM

## 2024-04-24 DIAGNOSIS — E11.65 CONTROLLED TYPE 2 DIABETES MELLITUS WITH HYPERGLYCEMIA, WITHOUT LONG-TERM CURRENT USE OF INSULIN (MULTI): ICD-10-CM

## 2024-04-24 RX ORDER — METFORMIN HYDROCHLORIDE 500 MG/1
1000 TABLET ORAL
Qty: 120 TABLET | Refills: 5 | Status: SHIPPED | OUTPATIENT
Start: 2024-04-24 | End: 2024-10-21

## 2024-04-24 RX ORDER — GEMFIBROZIL 600 MG/1
600 TABLET, FILM COATED ORAL 2 TIMES DAILY
Qty: 60 TABLET | Refills: 5 | Status: SHIPPED | OUTPATIENT
Start: 2024-04-24 | End: 2024-10-21

## 2024-04-24 NOTE — TELEPHONE ENCOUNTER
Rx Refill Request     Name: Tucker Holguin  :  1960   Medication Name:  Gemfibrozil 600mg, metformin   Specific Pharmacy location:  [facility]   Date of last appointment:  2024  Date of next appointment:  2024  Best number to reach patient:  [phone]

## 2024-05-03 ENCOUNTER — APPOINTMENT (OUTPATIENT)
Dept: CARDIOLOGY | Facility: HOSPITAL | Age: 64
End: 2024-05-03
Payer: COMMERCIAL

## 2024-05-03 ENCOUNTER — APPOINTMENT (OUTPATIENT)
Dept: RADIOLOGY | Facility: HOSPITAL | Age: 64
End: 2024-05-03
Payer: COMMERCIAL

## 2024-05-03 ENCOUNTER — HOSPITAL ENCOUNTER (EMERGENCY)
Facility: HOSPITAL | Age: 64
Discharge: HOME | End: 2024-05-03
Attending: STUDENT IN AN ORGANIZED HEALTH CARE EDUCATION/TRAINING PROGRAM
Payer: COMMERCIAL

## 2024-05-03 VITALS
TEMPERATURE: 97.2 F | RESPIRATION RATE: 18 BRPM | SYSTOLIC BLOOD PRESSURE: 129 MMHG | WEIGHT: 250 LBS | HEIGHT: 68 IN | DIASTOLIC BLOOD PRESSURE: 82 MMHG | OXYGEN SATURATION: 96 % | BODY MASS INDEX: 37.89 KG/M2 | HEART RATE: 58 BPM

## 2024-05-03 DIAGNOSIS — R07.9 CHEST PAIN, UNSPECIFIED TYPE: ICD-10-CM

## 2024-05-03 DIAGNOSIS — K21.9 GASTROESOPHAGEAL REFLUX DISEASE, UNSPECIFIED WHETHER ESOPHAGITIS PRESENT: Primary | ICD-10-CM

## 2024-05-03 LAB
ALBUMIN SERPL BCP-MCNC: 4.4 G/DL (ref 3.4–5)
ALP SERPL-CCNC: 83 U/L (ref 33–136)
ALT SERPL W P-5'-P-CCNC: 43 U/L (ref 10–52)
ANION GAP SERPL CALC-SCNC: 15 MMOL/L (ref 10–20)
AST SERPL W P-5'-P-CCNC: 23 U/L (ref 9–39)
ATRIAL RATE: 64 BPM
ATRIAL RATE: 64 BPM
BASOPHILS # BLD AUTO: 0.03 X10*3/UL (ref 0–0.1)
BASOPHILS NFR BLD AUTO: 0.6 %
BILIRUB SERPL-MCNC: 0.5 MG/DL (ref 0–1.2)
BNP SERPL-MCNC: 10 PG/ML (ref 0–99)
BUN SERPL-MCNC: 23 MG/DL (ref 6–23)
CALCIUM SERPL-MCNC: 9.7 MG/DL (ref 8.6–10.3)
CARDIAC TROPONIN I PNL SERPL HS: 5 NG/L (ref 0–20)
CARDIAC TROPONIN I PNL SERPL HS: 6 NG/L (ref 0–20)
CHLORIDE SERPL-SCNC: 101 MMOL/L (ref 98–107)
CO2 SERPL-SCNC: 25 MMOL/L (ref 21–32)
CREAT SERPL-MCNC: 1.11 MG/DL (ref 0.5–1.3)
EGFRCR SERPLBLD CKD-EPI 2021: 75 ML/MIN/1.73M*2
EOSINOPHIL # BLD AUTO: 0.21 X10*3/UL (ref 0–0.7)
EOSINOPHIL NFR BLD AUTO: 4.4 %
ERYTHROCYTE [DISTWIDTH] IN BLOOD BY AUTOMATED COUNT: 14 % (ref 11.5–14.5)
GLUCOSE SERPL-MCNC: 179 MG/DL (ref 74–99)
HCT VFR BLD AUTO: 41.2 % (ref 41–52)
HGB BLD-MCNC: 13.6 G/DL (ref 13.5–17.5)
IMM GRANULOCYTES # BLD AUTO: 0.01 X10*3/UL (ref 0–0.7)
IMM GRANULOCYTES NFR BLD AUTO: 0.2 % (ref 0–0.9)
INR PPP: 1 (ref 0.9–1.1)
LYMPHOCYTES # BLD AUTO: 0.72 X10*3/UL (ref 1.2–4.8)
LYMPHOCYTES NFR BLD AUTO: 15.1 %
MAGNESIUM SERPL-MCNC: 2 MG/DL (ref 1.6–2.4)
MCH RBC QN AUTO: 29.2 PG (ref 26–34)
MCHC RBC AUTO-ENTMCNC: 33 G/DL (ref 32–36)
MCV RBC AUTO: 88 FL (ref 80–100)
MONOCYTES # BLD AUTO: 0.57 X10*3/UL (ref 0.1–1)
MONOCYTES NFR BLD AUTO: 12 %
NEUTROPHILS # BLD AUTO: 3.22 X10*3/UL (ref 1.2–7.7)
NEUTROPHILS NFR BLD AUTO: 67.7 %
NRBC BLD-RTO: 0 /100 WBCS (ref 0–0)
P AXIS: 23 DEGREES
P AXIS: 25 DEGREES
P OFFSET: 167 MS
P OFFSET: 198 MS
P ONSET: 115 MS
P ONSET: 151 MS
PLATELET # BLD AUTO: 170 X10*3/UL (ref 150–450)
POTASSIUM SERPL-SCNC: 4.3 MMOL/L (ref 3.5–5.3)
PR INTERVAL: 148 MS
PR INTERVAL: 148 MS
PROT SERPL-MCNC: 7.3 G/DL (ref 6.4–8.2)
PROTHROMBIN TIME: 11.7 SECONDS (ref 9.8–12.8)
Q ONSET: 189 MS
Q ONSET: 225 MS
QRS COUNT: 10 BEATS
QRS COUNT: 11 BEATS
QRS DURATION: 138 MS
QRS DURATION: 140 MS
QT INTERVAL: 422 MS
QT INTERVAL: 428 MS
QTC CALCULATION(BAZETT): 435 MS
QTC CALCULATION(BAZETT): 441 MS
QTC FREDERICIA: 431 MS
QTC FREDERICIA: 437 MS
R AXIS: -17 DEGREES
R AXIS: -22 DEGREES
RBC # BLD AUTO: 4.66 X10*6/UL (ref 4.5–5.9)
SODIUM SERPL-SCNC: 137 MMOL/L (ref 136–145)
T AXIS: -2 DEGREES
T AXIS: 2 DEGREES
T OFFSET: 403 MS
T OFFSET: 436 MS
VENTRICULAR RATE: 64 BPM
VENTRICULAR RATE: 64 BPM
WBC # BLD AUTO: 4.8 X10*3/UL (ref 4.4–11.3)

## 2024-05-03 PROCEDURE — 71045 X-RAY EXAM CHEST 1 VIEW: CPT

## 2024-05-03 PROCEDURE — 36415 COLL VENOUS BLD VENIPUNCTURE: CPT | Performed by: REGISTERED NURSE

## 2024-05-03 PROCEDURE — 71045 X-RAY EXAM CHEST 1 VIEW: CPT | Performed by: RADIOLOGY

## 2024-05-03 PROCEDURE — 84484 ASSAY OF TROPONIN QUANT: CPT | Performed by: REGISTERED NURSE

## 2024-05-03 PROCEDURE — 83735 ASSAY OF MAGNESIUM: CPT | Performed by: REGISTERED NURSE

## 2024-05-03 PROCEDURE — 85610 PROTHROMBIN TIME: CPT | Performed by: REGISTERED NURSE

## 2024-05-03 PROCEDURE — 85025 COMPLETE CBC W/AUTO DIFF WBC: CPT | Performed by: REGISTERED NURSE

## 2024-05-03 PROCEDURE — 93005 ELECTROCARDIOGRAM TRACING: CPT

## 2024-05-03 PROCEDURE — 99283 EMERGENCY DEPT VISIT LOW MDM: CPT | Mod: 25

## 2024-05-03 PROCEDURE — 80053 COMPREHEN METABOLIC PANEL: CPT | Performed by: REGISTERED NURSE

## 2024-05-03 PROCEDURE — 83880 ASSAY OF NATRIURETIC PEPTIDE: CPT | Performed by: REGISTERED NURSE

## 2024-05-03 RX ORDER — PANTOPRAZOLE SODIUM 40 MG/1
40 TABLET, DELAYED RELEASE ORAL
Qty: 30 TABLET | Refills: 11 | Status: SHIPPED | OUTPATIENT
Start: 2024-05-03 | End: 2025-05-03

## 2024-05-03 ASSESSMENT — HEART SCORE
RISK FACTORS: >2 RISK FACTORS OR HX OF ATHEROSCLEROTIC DISEASE
HEART SCORE: 4
AGE: 45-64
TROPONIN: LESS THAN OR EQUAL TO NORMAL LIMIT
ECG: NORMAL
HISTORY: MODERATELY SUSPICIOUS

## 2024-05-03 ASSESSMENT — COLUMBIA-SUICIDE SEVERITY RATING SCALE - C-SSRS
2. HAVE YOU ACTUALLY HAD ANY THOUGHTS OF KILLING YOURSELF?: NO
6. HAVE YOU EVER DONE ANYTHING, STARTED TO DO ANYTHING, OR PREPARED TO DO ANYTHING TO END YOUR LIFE?: NO
1. IN THE PAST MONTH, HAVE YOU WISHED YOU WERE DEAD OR WISHED YOU COULD GO TO SLEEP AND NOT WAKE UP?: NO

## 2024-05-03 ASSESSMENT — LIFESTYLE VARIABLES
HAVE PEOPLE ANNOYED YOU BY CRITICIZING YOUR DRINKING: NO
EVER HAD A DRINK FIRST THING IN THE MORNING TO STEADY YOUR NERVES TO GET RID OF A HANGOVER: NO
TOTAL SCORE: 0
HAVE YOU EVER FELT YOU SHOULD CUT DOWN ON YOUR DRINKING: NO
EVER FELT BAD OR GUILTY ABOUT YOUR DRINKING: NO

## 2024-05-03 ASSESSMENT — PAIN DESCRIPTION - PAIN TYPE: TYPE: ACUTE PAIN

## 2024-05-03 ASSESSMENT — PAIN SCALES - GENERAL
PAINLEVEL_OUTOF10: 2
PAINLEVEL_OUTOF10: 2
PAINLEVEL_OUTOF10: 6
PAINLEVEL_OUTOF10: 2

## 2024-05-03 ASSESSMENT — PAIN DESCRIPTION - LOCATION: LOCATION: CHEST

## 2024-05-03 ASSESSMENT — PAIN DESCRIPTION - FREQUENCY: FREQUENCY: INTERMITTENT

## 2024-05-03 ASSESSMENT — PAIN DESCRIPTION - DESCRIPTORS
DESCRIPTORS: PRESSURE

## 2024-05-03 ASSESSMENT — PAIN DESCRIPTION - ORIENTATION: ORIENTATION: RIGHT;LEFT;UPPER

## 2024-05-03 ASSESSMENT — PAIN - FUNCTIONAL ASSESSMENT: PAIN_FUNCTIONAL_ASSESSMENT: 0-10

## 2024-05-03 NOTE — ED PROVIDER NOTES
"HPI   Chief Complaint   Patient presents with    Chest Pain         History provided by:  Patient   used: No      63-year-old male with past medical history significant for type 2 diabetes, diverticulitis, hyperlipidemia, and hypertension presents emergency department today for evaluation of chest pain.  Patient tells me since Wednesday he has been experiencing what he describes as a \"hunger pain\" that originates in his neck and moves down into his chest and shoulders.  Patient tells me that nothing makes the pain better or worse.  Patient tells me currently his pain is 2/10 on the pain scale.  Patient tells me the pain happens primarily at night.  Patient denies any shortness of breath.  Patient denies any nausea vomiting or diarrhea.  Patient denies abdominal pain.  Patient denies any dizziness or lightheadedness, fever or chills, numbness or tingling, headache, or urinary symptoms.                  [place] Coma Scale Score: 15                     Patient History   Past Medical History:   Diagnosis Date    Diabetes mellitus (Multi)     Diverticulitis     Hyperlipidemia     Hypertension     Personal history of diseases of the skin and subcutaneous tissue     History of cellulitis    Personal history of other diseases of the musculoskeletal system and connective tissue     History of arthritis    Personal history of other endocrine, nutritional and metabolic disease 08/22/2022    History of type 2 diabetes mellitus    Umbilical hernia without obstruction or gangrene 09/02/2021    Umbilical hernia without obstruction and without gangrene    Unspecified visual loss     Vision problems     Past Surgical History:   Procedure Laterality Date    OTHER SURGICAL HISTORY  09/24/2020    Colonoscopy    OTHER SURGICAL HISTORY  09/24/2020    Eye surgery    OTHER SURGICAL HISTORY  06/01/2021    Knee arthroscopy    OTHER SURGICAL HISTORY  09/07/2022    Joint replacement procedure    OTHER SURGICAL HISTORY  " 03/11/2021    Umbilical hernia repair laparoscopic     Family History   Problem Relation Name Age of Onset    Diabetes Mother      Cancer Brother      Heart attack Brother      Cancer Other      Diabetes Other      Heart attack Other       Social History     Tobacco Use    Smoking status: Never     Passive exposure: Never    Smokeless tobacco: Never   Vaping Use    Vaping status: Never Used   Substance Use Topics    Alcohol use: Never    Drug use: Never       Physical Exam   ED Triage Vitals [05/03/24 0907]   Temperature Heart Rate Respirations BP   36.2 °C (97.2 °F) 64 18 140/74      Pulse Ox Temp Source Heart Rate Source Patient Position   98 % Temporal -- --      BP Location FiO2 (%)     -- --       Physical Exam  Vitals and nursing note reviewed.   Constitutional:       Appearance: He is well-developed.   Cardiovascular:      Rate and Rhythm: Normal rate and regular rhythm.      Pulses: Normal pulses.      Heart sounds: Normal heart sounds.   Pulmonary:      Effort: Pulmonary effort is normal.      Breath sounds: Normal breath sounds. No decreased breath sounds or wheezing.   Abdominal:      General: Bowel sounds are normal.      Palpations: Abdomen is soft.   Musculoskeletal:         General: Normal range of motion.      Right lower leg: No edema.      Left lower leg: No edema.   Skin:     General: Skin is warm and dry.      Capillary Refill: Capillary refill takes less than 2 seconds.   Neurological:      General: No focal deficit present.      Mental Status: He is alert and oriented to person, place, and time.   Psychiatric:         Mood and Affect: Mood normal.         Behavior: Behavior normal.         ED Course & MDM   Diagnoses as of 05/03/24 1126   Gastroesophageal reflux disease, unspecified whether esophagitis present   Chest pain, unspecified type       Medical Decision Making  Patient seen examined emergency department; patient is healthy and nontoxic in appearance not appearing acute distress.   Patient lung sounds are clear bilateral without any adventitious noise.  Heart regular rate and rhythm without any murmur noted.  Patient's abdomen is soft, nontender.  No peripheral edema noted.  Bilateral pedal pulses palpable.  Will obtain chest x-ray, EKG and troponin to rule out acute ACS.  No pain medication given at this time as patient tells me his pain is only a 2/10.  I did encourage him to notify myself or bedside nurse if this changes.    EKG at 09:00 with ventricular rate of 64, as interpreted by me, shows a normal rate and rhythm with a right bundle branch block, normal axis, normal intervals. And normal ST and T wave pattern with no evidence of acute ischemia or other acute findings.  EKG is unchanged when compared to EKG performed 8/30/2022    Repeat EKG at 10:23 with ventricular rate of 64, as interpreted by me, shows a normal rate and rhythm with PVCs and a right bundle branch block, normal axis, normal intervals. And normal ST and T wave pattern with no evidence of acute ischemia or other acute findings    Patient's high sensitive troponins were negative x 2.  BNP is 10.  CMP significant for glucose of 179.  Magnesium is 2.  PT/INR 11.7/1.  CBC is unremarkable on my review.  Chest x-ray without any acute pathology noted by radiology.  Patient's heart score is 4 due to his comorbidities and family history.    Patient I discussed diagnostic results.  Given that patient's heart score is 4 that is reasonable that he could be admitted to the hospital for further cardiac evaluation or discharged home with close outpatient follow-up.  Patient I discussed these options.  With mutual decision making patient decided to be discharged home.  Registration did make patient appointment with cardiology prior to discharge.  Additionally patient will be discharged home with prescription for Protonix as he likely does have GERD given that his symptoms present in the evening when he goes to lay down.  Patient given  strict return parameters.  All patient's questions and concerns were addressed prior to discharge.  Patient discharged home in stable condition.      Labs Reviewed   CBC WITH AUTO DIFFERENTIAL - Abnormal       Result Value    WBC 4.8      nRBC 0.0      RBC 4.66      Hemoglobin 13.6      Hematocrit 41.2      MCV 88      MCH 29.2      MCHC 33.0      RDW 14.0      Platelets 170      Neutrophils % 67.7      Immature Granulocytes %, Automated 0.2      Lymphocytes % 15.1      Monocytes % 12.0      Eosinophils % 4.4      Basophils % 0.6      Neutrophils Absolute 3.22      Immature Granulocytes Absolute, Automated 0.01      Lymphocytes Absolute 0.72 (*)     Monocytes Absolute 0.57      Eosinophils Absolute 0.21      Basophils Absolute 0.03     COMPREHENSIVE METABOLIC PANEL - Abnormal    Glucose 179 (*)     Sodium 137      Potassium 4.3      Chloride 101      Bicarbonate 25      Anion Gap 15      Urea Nitrogen 23      Creatinine 1.11      eGFR 75      Calcium 9.7      Albumin 4.4      Alkaline Phosphatase 83      Total Protein 7.3      AST 23      Bilirubin, Total 0.5      ALT 43     MAGNESIUM - Normal    Magnesium 2.00     PROTIME-INR - Normal    Protime 11.7      INR 1.0     B-TYPE NATRIURETIC PEPTIDE - Normal    BNP 10      Narrative:        <100 pg/mL - Heart failure unlikely  100-299 pg/mL - Intermediate probability of acute heart                  failure exacerbation. Correlate with clinical                  context and patient history.    >=300 pg/mL - Heart Failure likely. Correlate with clinical                  context and patient history.    BNP testing is performed using different testing methodology at [facility] than at other [facility] hospitals. Direct result comparisons should only be made within the same method.      SERIAL TROPONIN-INITIAL - Normal    Troponin I, High Sensitivity 6      Narrative:     Less than 99th percentile of normal range cutoff-  Female and children under 18 years old <14  ng/L; Male <21 ng/L: Negative  Repeat testing should be performed if clinically indicated.     Female and children under 18 years old 14-50 ng/L; Male 21-50 ng/L:  Consistent with possible cardiac damage and possible increased clinical   risk. Serial measurements may help to assess extent of myocardial damage.     >50 ng/L: Consistent with cardiac damage, increased clinical risk and  myocardial infarction. Serial measurements may help assess extent of   myocardial damage.      NOTE: Children less than 1 year old may have higher baseline troponin   levels and results should be interpreted in conjunction with the overall   clinical context.     NOTE: Troponin I testing is performed using a different   testing methodology at [facility] than at other   [facility]. Direct result comparisons should only   be made within the same method.   SERIAL TROPONIN, 1 HOUR - Normal    Troponin I, High Sensitivity 5      Narrative:     Less than 99th percentile of normal range cutoff-  Female and children under 18 years old <14 ng/L; Male <21 ng/L: Negative  Repeat testing should be performed if clinically indicated.     Female and children under 18 years old 14-50 ng/L; Male 21-50 ng/L:  Consistent with possible cardiac damage and possible increased clinical   risk. Serial measurements may help to assess extent of myocardial damage.     >50 ng/L: Consistent with cardiac damage, increased clinical risk and  myocardial infarction. Serial measurements may help assess extent of   myocardial damage.      NOTE: Children less than 1 year old may have higher baseline troponin   levels and results should be interpreted in conjunction with the overall   clinical context.     NOTE: Troponin I testing is performed using a different   testing methodology at [facility] than at other   [facility]. Direct result comparisons should only   be made within the same method.   TROPONIN SERIES- (INITIAL, 1 HR)     Narrative:     The following orders were created for panel order Troponin I Series, High Sensitivity (0, 1 HR).  Procedure                               Abnormality         Status                     ---------                               -----------         ------                     Troponin I, High Sensiti...[[id number]]  Normal              Final result               Troponin, High Sensitivi...[[id number]]  Normal              Final result                 Please view results for these tests on the individual orders.       XR chest 1 view   Final Result   No acute pathologic findings are identified.   There is no interval change when compared to the previous examination.        MACRO:   none        Signed by: Elver Langston 5/3/2024 9:56 AM   Dictation workstation:   [id number]            Procedure  Procedures     Lary Torrez, ERIKA-CNP  05/03/24 1144

## 2024-05-13 ENCOUNTER — OFFICE VISIT (OUTPATIENT)
Dept: CARDIOLOGY | Facility: CLINIC | Age: 64
End: 2024-05-13
Payer: COMMERCIAL

## 2024-05-13 VITALS
HEART RATE: 71 BPM | BODY MASS INDEX: 39.63 KG/M2 | WEIGHT: 261.5 LBS | HEIGHT: 68 IN | SYSTOLIC BLOOD PRESSURE: 154 MMHG | DIASTOLIC BLOOD PRESSURE: 88 MMHG

## 2024-05-13 DIAGNOSIS — I10 HYPERTENSION, UNSPECIFIED TYPE: ICD-10-CM

## 2024-05-13 DIAGNOSIS — E78.2 MIXED HYPERLIPIDEMIA: ICD-10-CM

## 2024-05-13 DIAGNOSIS — Z78.9 NEVER SMOKED TOBACCO: ICD-10-CM

## 2024-05-13 DIAGNOSIS — I10 BENIGN ESSENTIAL HTN: ICD-10-CM

## 2024-05-13 DIAGNOSIS — R07.89 OTHER CHEST PAIN: ICD-10-CM

## 2024-05-13 DIAGNOSIS — I45.10 COMPLETE RIGHT BUNDLE BRANCH BLOCK (RBBB): ICD-10-CM

## 2024-05-13 DIAGNOSIS — R01.1 SYSTOLIC EJECTION MURMUR: ICD-10-CM

## 2024-05-13 DIAGNOSIS — R06.02 SHORTNESS OF BREATH ON EXERTION: ICD-10-CM

## 2024-05-13 DIAGNOSIS — Z76.89 ESTABLISHING CARE WITH NEW DOCTOR, ENCOUNTER FOR: ICD-10-CM

## 2024-05-13 PROCEDURE — 3008F BODY MASS INDEX DOCD: CPT | Performed by: INTERNAL MEDICINE

## 2024-05-13 PROCEDURE — 1036F TOBACCO NON-USER: CPT | Performed by: INTERNAL MEDICINE

## 2024-05-13 PROCEDURE — 4010F ACE/ARB THERAPY RXD/TAKEN: CPT | Performed by: INTERNAL MEDICINE

## 2024-05-13 PROCEDURE — 99204 OFFICE O/P NEW MOD 45 MIN: CPT | Performed by: INTERNAL MEDICINE

## 2024-05-13 PROCEDURE — 93000 ELECTROCARDIOGRAM COMPLETE: CPT | Performed by: INTERNAL MEDICINE

## 2024-05-13 PROCEDURE — 3079F DIAST BP 80-89 MM HG: CPT | Performed by: INTERNAL MEDICINE

## 2024-05-13 PROCEDURE — 3077F SYST BP >= 140 MM HG: CPT | Performed by: INTERNAL MEDICINE

## 2024-05-13 RX ORDER — LISINOPRIL 20 MG/1
20 TABLET ORAL DAILY
Qty: 90 TABLET | Refills: 3 | Status: SHIPPED | OUTPATIENT
Start: 2024-05-13 | End: 2025-05-13

## 2024-05-13 NOTE — PATIENT INSTRUCTIONS
Continue same medications/treatment.  Patient educated on proper medication use.  Patient educated on risk factor modification.  Please bring any lab results from other providers/physicians to your next appointment.    Please bring all medicines, vitamins, and herbal supplements with you when you come to the office.    Prescriptions will not be filled unless you are compliant with your follow up appointments or have a follow up appointment scheduled as per instruction of your physician. Refills should be requested at the time of your visit.    Follow up after testing  ECHO, MPX stress test, calcium score testing to be done    IMERLE RN, AM SCRIBING FOR AND IN THE PRESENCE OF DR. DANIELLE GAMBINO, DO, FACC

## 2024-05-13 NOTE — PROGRESS NOTES
CARDIOLOGY NEW PATIENT OFFICE VISIT    Patient:  Tucker Holguin  YOB: 1960  MRN: [id number]       Chief Complaint/Active Symptoms:       Tucker Holguin is a 63 y.o. male who is being seen today at the request of ER doctor for evaluation of chest pain.  Patient has no prior cardiac history.  He has had stress test many years ago which were normal.  He had an echo done a few years ago in advance of knee surgeries which showed no significant abnormalities.  Patient apparently had some spells where he would start to get some tightening in his neck rating down into his chest and shoulders.  This occurred on a few occasions he went to the emergency room recently to get checked out.  At that time he had no significant findings were observed and he was advised to see a heart doctor in follow-up.  Patient has never been evaluated cardiac wise.  He does have various risk factors including low degree of diabetes hypertension and dyslipidemia.  He is non-smoker.  He is overweight and is 63 years of age.  He does have family history of coronary disease.  He himself has never been told he had coronary disease or other major issues with vascular problems.  He does have sleep apnea and some diverticular disease in the past.    Past surgical history includes hernia repair and knee surgeries and replacements.    Patient denies any other major additional GI  renal nephrologic urologic endocrine hematologic vascular pulmonary or cardiac abnormalities.    Social history is negative for smoking no excess alcohol use no drug use Works full-time.    Family history is inclusive of coronary disease in siblings and parents.    Chief Complaint   Patient presents with    New Patient Visit     Due to recent ED visit for chest pain       History of Present Illness:   HPI      Allergies:     Allergies   Allergen Reactions    Demeclocycline Hives    Penicillins Fever, Hallucinations and Hives        Outpatient Medications:      Current Outpatient Medications   Medication Instructions    blood sugar diagnostic (Accu-Chek Teetee Plus test strp) strip TEST ONCE D    dapagliflozin propanediol (FARXIGA) 10 mg, oral, Daily before breakfast    ezetimibe (ZETIA) 10 mg, oral, Daily    gemfibrozil (LOPID) 600 mg, oral, 2 times daily    lisinopril 10 mg, oral, Daily    metFORMIN (GLUCOPHAGE) 1,000 mg, oral, 2 times daily with meals    pantoprazole (PROTONIX) 40 mg, oral, Daily before breakfast, Do not crush, chew, or split.        Past Medical History:     Past Medical History:   Diagnosis Date    Diabetes mellitus (Multi)     Diverticulitis     Hyperlipidemia     Hypertension     Personal history of diseases of the skin and subcutaneous tissue     History of cellulitis    Personal history of other diseases of the musculoskeletal system and connective tissue     History of arthritis    Personal history of other endocrine, nutritional and metabolic disease 08/22/2022    History of type 2 diabetes mellitus    Sleep apnea     Umbilical hernia without obstruction or gangrene 09/02/2021    Umbilical hernia without obstruction and without gangrene    Unspecified visual loss     Vision problems       Social History:     Social History     Tobacco Use    Smoking status: Never     Passive exposure: Never    Smokeless tobacco: Never   Vaping Use    Vaping status: Never Used   Substance Use Topics    Alcohol use: Yes     Alcohol/week: 1.0 standard drink of alcohol     Types: 1 Cans of beer per week     Comment: Occasionally. Not to excess.    Drug use: Never       Family History:        Family History   Problem Relation Name Age of Onset    Diabetes Mother Iliana Burgos     Cancer Mother Iliana Burgos     Diabetes type II Mother Iliana Burgos     Cancer Brother Konstantin Holguin     Heart attack Brother Konstantin Holguin     Cancer Brother Wesley Holguin     Heart attack Brother Wesley Holguin     Diabetes type II Brother Deshaun Holguin     Heart disease Brother Deshaun Holguin      Obesity Brother Deshaun Holguin     Diabetes type II Brother Levy Holguin     Heart attack Brother Levy Holguin     Cancer Other      Diabetes Other      Heart attack Other         Review of Systems:     Review of Systems   All other systems reviewed and are negative.      Objective:     Vitals:    05/13/24 1403   BP: 154/88   Pulse: 71       Vitals:    05/13/24 1403   Weight: 119 kg (261 lb 8 oz)       Physical Examination:   Constitutional:       Appearance: Healthy appearance. Not in distress.   Neck:      Vascular: No JVR. JVD normal.   Pulmonary:      Effort: Pulmonary effort is normal.      Breath sounds: Normal breath sounds. No wheezing. No rhonchi. No rales.   Chest:      Chest wall: Not tender to palpatation.   Cardiovascular:      PMI at left midclavicular line. Normal rate. Regular rhythm. Normal S1. Normal S2.       Murmurs: There is a grade 1/6 systolic murmur at the URSB and ULSB.      No gallop.  No click. No rub.   Pulses:     Intact distal pulses.   Edema:     Peripheral edema absent.   Abdominal:      General: Bowel sounds are normal.      Palpations: Abdomen is soft.      Tenderness: There is no abdominal tenderness.   Musculoskeletal: Normal range of motion.         General: No tenderness. Skin:     General: Skin is warm and dry.   Neurological:      General: No focal deficit present.      Mental Status: Alert and oriented to person, place and time.            Lab:     CBC:   Lab Results   Component Value Date    WBC 4.8 05/03/2024    RBC 4.66 05/03/2024    HGB 13.6 05/03/2024    HCT 41.2 05/03/2024     05/03/2024        CMP:    Lab Results   Component Value Date     05/03/2024    K 4.3 05/03/2024     05/03/2024    CO2 25 05/03/2024    BUN 23 05/03/2024    CREATININE 1.11 05/03/2024    GLUCOSE 179 (H) 05/03/2024    CALCIUM 9.7 05/03/2024       Magnesium:    Lab Results   Component Value Date    MG 2.00 05/03/2024       Lipid Profile:    Lab Results   Component Value Date    TRIG 209  "(H) 09/07/2022    HDL 37.0 (A) 09/07/2022       TSH:    No results found for: \"TSH\"    BNP:   Lab Results   Component Value Date    BNP 10 05/03/2024        PT/INR:    Lab Results   Component Value Date    PROTIME 11.7 05/03/2024    INR 1.0 05/03/2024       HgBA1c:    Lab Results   Component Value Date    HGBA1C 7.7 (A) 01/24/2023       BMP:  Lab Results   Component Value Date     05/03/2024     (L) 09/28/2023     09/13/2022     09/07/2022    K 4.3 05/03/2024    K 4.0 09/28/2023    K 4.3 09/13/2022    K 4.7 09/07/2022     05/03/2024     09/28/2023     09/13/2022     09/07/2022    CO2 25 05/03/2024    CO2 23 09/28/2023    CO2 25 09/13/2022    CO2 28 09/07/2022    BUN 23 05/03/2024    BUN 16 09/28/2023    BUN 23 09/13/2022    BUN 24 (H) 09/07/2022    CREATININE 1.11 05/03/2024    CREATININE 1.00 09/28/2023    CREATININE 1.20 09/13/2022    CREATININE 1.00 09/07/2022       CBC:  Lab Results   Component Value Date    WBC 4.8 05/03/2024    WBC 9.4 09/28/2023    WBC 10.2 09/13/2022    WBC 6.1 09/07/2022    RBC 4.66 05/03/2024    RBC 4.71 09/28/2023    RBC 4.28 (L) 09/13/2022    RBC 4.92 09/07/2022    HGB 13.6 05/03/2024    HGB 13.7 09/28/2023    HGB 12.1 (L) 09/13/2022    HGB 14.3 09/07/2022    HCT 41.2 05/03/2024    HCT 41.1 09/28/2023    HCT 37.2 (L) 09/13/2022    HCT 44.2 09/07/2022    MCV 88 05/03/2024    MCV 87 09/28/2023    MCV 87 09/13/2022    MCV 90 09/07/2022    MCH 29.2 05/03/2024    MCHC 33.0 05/03/2024    MCHC 33.3 09/28/2023    MCHC 32.5 09/13/2022    MCHC 32.4 09/07/2022    RDW 14.0 05/03/2024    RDW 14.3 09/28/2023    RDW 13.6 09/13/2022    RDW 14.2 09/07/2022     05/03/2024     09/28/2023     09/13/2022     09/07/2022       Cardiac Enzymes:    Lab Results   Component Value Date    TROPHS 5 05/03/2024    TROPHS 6 05/03/2024    TROPHS 3 09/28/2023       Hepatic Function Panel:    Lab Results   Component Value Date    ALKPHOS 83 " 05/03/2024    ALT 43 05/03/2024    AST 23 05/03/2024    PROT 7.3 05/03/2024    BILITOT 0.5 05/03/2024    BILIDIR 0.0 09/28/2023         Diagnostic Studies:     ECG 12 lead    Result Date: 5/3/2024  Sinus rhythm with Premature ventricular complexes or Fusion complexes Right bundle branch block Abnormal ECG When compared with ECG of 03-MAY-2024 09:00, (unconfirmed) Fusion complexes are now Present Premature ventricular complexes are now Present See ED provider note for full interpretation and clinical correlation Confirmed by Marie Latham ([id number]) on 5/3/2024 12:01:37 PM    ECG 12 lead    Result Date: 5/3/2024  Normal sinus rhythm Right bundle branch block Abnormal ECG When compared with ECG of 30-AUG-2022 11:50, No significant change was found See ED provider note for full interpretation and clinical correlation Confirmed by Marie Latham ([id number]) on 5/3/2024 11:48:07 AM    XR chest 1 view    Result Date: 5/3/2024  Interpreted By:  Elver Langston, STUDY: XR CHEST 1 VIEW; 5/3/2024 9:41 am   INDICATION: CLINICAL INFORMATION: Signs/Symptoms:Chest Pain. Chest pain since Wednesday.   COMPARISON: 07/20/2022   ACCESSION NUMBER(S): [id number]   ORDERING CLINICIAN: ALLISON AN   TECHNIQUE: Portable chest one view.   FINDINGS: The cardiac size is indeterminate in view of the AP projection.    No infiltrates or effusions are identified.       No acute pathologic findings are identified. There is no interval change when compared to the previous examination.   MACRO: none   Signed by: Elver Langston 5/3/2024 9:56 AM Dictation workstation:   [id number]    Current EKG:  Sinus rhythm  Right bundle branch block pattern  Unchanged from prior tracings  Abnormal EKG    Home Schafer DO,[facility]      Radiology:     No orders to display       Assessment/Plan:           Assessment:       63-year-old gentleman here for evaluation of atypical chest pain.    Meds, vitals, examination as noted.    Chart reviewed in detail discussed the  patient at length including recent emergency room visit and dated pain during that study included normal BNP and normal troponins and unchanged EKG.    Impression:  Atypical chest pain  Non-insulin-dependent diabetes mellitus  Hyperlipidemia  Essential hypertension  Obesity  Family history of coronary disease  Bilateral knee replacements  Abnormal EKG with chronic right bundle branch block pattern  Plan:   Recommendation:  Will begin baby aspirin empirically  Will increase lisinopril for better blood pressure management  Schedule stress perfusion and echo along with calcium score test in the near future  See me afterwards to review make any additional recommendations  Any condition change notify our office  Maintain normal unit and usual activities  Will ultimately follow-up with primary care as well

## 2024-06-05 ASSESSMENT — ENCOUNTER SYMPTOMS
SWEATS: 0
BLACKOUTS: 0
TREMORS: 0
VISUAL CHANGE: 0
WEAKNESS: 0
CONFUSION: 0
SEIZURES: 0
WEIGHT LOSS: 0
POLYDIPSIA: 0
NERVOUS/ANXIOUS: 0
POLYPHAGIA: 0
HUNGER: 0
BLURRED VISION: 0
HEADACHES: 0
FATIGUE: 0
SPEECH DIFFICULTY: 0
DIZZINESS: 0

## 2024-06-06 ENCOUNTER — OFFICE VISIT (OUTPATIENT)
Dept: PRIMARY CARE | Facility: CLINIC | Age: 64
End: 2024-06-06
Payer: COMMERCIAL

## 2024-06-06 VITALS
BODY MASS INDEX: 39.59 KG/M2 | TEMPERATURE: 96.8 F | WEIGHT: 261.2 LBS | RESPIRATION RATE: 16 BRPM | DIASTOLIC BLOOD PRESSURE: 62 MMHG | HEART RATE: 80 BPM | SYSTOLIC BLOOD PRESSURE: 126 MMHG | OXYGEN SATURATION: 95 % | HEIGHT: 68 IN

## 2024-06-06 DIAGNOSIS — K76.0 HEPATIC STEATOSIS: ICD-10-CM

## 2024-06-06 DIAGNOSIS — E11.65 CONTROLLED TYPE 2 DIABETES MELLITUS WITH HYPERGLYCEMIA, WITHOUT LONG-TERM CURRENT USE OF INSULIN (MULTI): ICD-10-CM

## 2024-06-06 DIAGNOSIS — E66.09 CLASS 2 OBESITY DUE TO EXCESS CALORIES WITHOUT SERIOUS COMORBIDITY WITH BODY MASS INDEX (BMI) OF 38.0 TO 38.9 IN ADULT: ICD-10-CM

## 2024-06-06 DIAGNOSIS — E78.2 MIXED HYPERLIPIDEMIA: ICD-10-CM

## 2024-06-06 DIAGNOSIS — I10 BENIGN ESSENTIAL HTN: ICD-10-CM

## 2024-06-06 DIAGNOSIS — E66.01 CLASS 3 SEVERE OBESITY DUE TO EXCESS CALORIES WITHOUT SERIOUS COMORBIDITY WITH BODY MASS INDEX (BMI) OF 45.0 TO 49.9 IN ADULT (MULTI): Primary | ICD-10-CM

## 2024-06-06 PROCEDURE — 3074F SYST BP LT 130 MM HG: CPT | Performed by: FAMILY MEDICINE

## 2024-06-06 PROCEDURE — 99214 OFFICE O/P EST MOD 30 MIN: CPT | Performed by: FAMILY MEDICINE

## 2024-06-06 PROCEDURE — 4010F ACE/ARB THERAPY RXD/TAKEN: CPT | Performed by: FAMILY MEDICINE

## 2024-06-06 PROCEDURE — 3008F BODY MASS INDEX DOCD: CPT | Performed by: FAMILY MEDICINE

## 2024-06-06 PROCEDURE — 3078F DIAST BP <80 MM HG: CPT | Performed by: FAMILY MEDICINE

## 2024-06-06 PROCEDURE — 1036F TOBACCO NON-USER: CPT | Performed by: FAMILY MEDICINE

## 2024-06-06 RX ORDER — NAPROXEN SODIUM 220 MG/1
81 TABLET, FILM COATED ORAL DAILY
Qty: 90 TABLET | Refills: 3
Start: 2024-06-06 | End: 2025-06-06

## 2024-06-06 ASSESSMENT — ENCOUNTER SYMPTOMS
CONFUSION: 0
SEIZURES: 0
BLACKOUTS: 0
FATIGUE: 0
HUNGER: 0
WEAKNESS: 0
SWEATS: 0
POLYPHAGIA: 0
HEADACHES: 0
TREMORS: 0
WEIGHT LOSS: 0
POLYDIPSIA: 0
VISUAL CHANGE: 0
NERVOUS/ANXIOUS: 0
SPEECH DIFFICULTY: 0
BLURRED VISION: 0
DIZZINESS: 0

## 2024-06-06 ASSESSMENT — PATIENT HEALTH QUESTIONNAIRE - PHQ9
1. LITTLE INTEREST OR PLEASURE IN DOING THINGS: NOT AT ALL
SUM OF ALL RESPONSES TO PHQ9 QUESTIONS 1 & 2: 0
2. FEELING DOWN, DEPRESSED OR HOPELESS: NOT AT ALL

## 2024-06-06 NOTE — PROGRESS NOTES
Subjective   Patient ID: Tucker Holguin is a 63 y.o. male who presents for Follow-up (Mixed hyperlipidemia; Benign essential HTN; Controlled type 2 diabetes mellitus with hyperglycemia, without long-term current use of insulin (Multi); Class 2 obesity due to excess calories without serious comorbidity with body mass index (BMI) of 38.0 to 38.9 in adult). I last saw the patient on 10/26/2023    Pt went to ER 5/3   Clinical impressions Gastroesophageal reflux disease, unspecified whether esophagitis present & Chest pain, unspecified type  Discharged with a referral to cardiology   Patient is eating well and exercising as tolerated  Taking medication as directed  Has no medical concerns for rooming MA    The patient reports that his recent EKG and chest X-ray was good. He will be following up with his cardiologist for a stress test. His doctors think he has GERD. He is taking pantoprazole and low-dose aspirin 81 mg. His lisinopril was increased to 10 mg daily. He has 2 skin lesions beneath each axilla and a scaly lesion of the right upper forehead.    Diabetes  He has type 2 diabetes mellitus. No MedicAlert identification noted. The initial diagnosis of diabetes was made 9 years ago. Pertinent negatives for hypoglycemia include no confusion, dizziness, headaches, hunger, mood changes, nervousness/anxiousness, pallor, seizures, sleepiness, speech difficulty, sweats or tremors. Pertinent negatives for diabetes include no blurred vision, no chest pain, no fatigue, no foot paresthesias, no foot ulcerations, no polydipsia, no polyphagia, no polyuria, no visual change, no weakness and no weight loss. Pertinent negatives for hypoglycemia complications include no blackouts, no hospitalization, no nocturnal hypoglycemia, no required assistance and no required glucagon injection. Diabetic complications include impotence. Pertinent negatives for diabetic complications include no CVA, heart disease, nephropathy, peripheral  neuropathy, PVD or retinopathy. Risk factors for coronary artery disease include dyslipidemia, family history and obesity. Current diabetic treatment includes oral agent (dual therapy). He is compliant with treatment all of the time. His weight is fluctuating minimally. He is following a generally healthy diet. Meal planning includes avoidance of concentrated sweets. He has not had a previous visit with a dietitian. He participates in exercise three times a week. He monitors blood glucose at home 1-2 x per day. Blood glucose monitoring compliance is fair. There is no change in his home blood glucose trend. His breakfast blood glucose is taken between 6-7 am. His breakfast blood glucose range is generally 110-130 mg/dl. His dinner blood glucose is taken between 7-8 pm. His dinner blood glucose range is generally 180-200 mg/dl. His overall blood glucose range is 130-140 mg/dl. He does not see a podiatrist.Eye exam is not current.   He is taking metformin 1000 mg (2 tabs) daily.    Patient has no medical complaints today. Patient wonders if he might be a candidate for Ozempic since he is diabetic and could benefit from weight loss.    Review of Systems   Constitutional:  Negative for fatigue and weight loss.   Eyes:  Negative for blurred vision.   Cardiovascular:  Negative for chest pain.   Endocrine: Negative for polydipsia, polyphagia and polyuria.   Genitourinary:  Positive for impotence.   Skin:  Negative for pallor.   Neurological:  Negative for dizziness, tremors, seizures, speech difficulty, weakness and headaches.   Psychiatric/Behavioral:  Negative for confusion. The patient is not nervous/anxious.      Except positives as noted in the CC & HPI  Constitutional: Denies fevers, chills, night sweats, fatigue, weight changes, change in appetite    Eyes: Denies blurry vision, double vision    ENT: Denies otalgia, trouble hearing, tinnitus, vertigo, nasal congestion, rhinorrhea, sore throat    Neck: Denies swelling,  "masses    Cardiovascular: Denies chest pain, palpitations, edema, orthopnea, syncope    Respiratory: Denies dyspnea, cough, wheezing, postural nocturnal dyspnea    Gastrointestinal: Denies abdominal pain, nausea, vomiting, diarrhea, constipation, melena, hematochezia    Genitourinary: Denies dysuria, hematuria, frequency, urgency    Musculoskeletal: Denies back pain, neck pain, arthralgias, myalgias    Integumentary: Denies skin lesions, rashes, masses    Neurological: Denies dizziness, headaches, confusion, limb weakness, paresthesias, syncope, convulsions    Psychiatric: Denies depression, anxiety, homicidal ideations, suicidal ideations, sleep disturbances    Endocrine: Denies polyphagia, polydipsia, polyuria, weakness, hair thinning, heat intolerance, cold intolerance, weight changes    Heme/Lymph: Denies easy bruising, easy bleeding, swollen glands    Objective   /62 (BP Location: Right arm, Patient Position: Sitting, BP Cuff Size: Large adult)   Pulse 80   Temp 36 °C (96.8 °F) (Temporal)   Resp 16   Ht 1.727 m (5' 8\")   Wt 118 kg (261 lb 3.2 oz)   SpO2 95%   BMI 39.72 kg/m²     Physical Exam  132/64 and 126/62 on recheck of BP in the right arm.    General Appearance - well-developed, well-nourished, 63 y.o., White male in no acute distress.     Skin - Light tan, slightly raised skin lesion of the infraaxillary region. Warm, pink and dry without rash or concerning lesions. No perceptible lesion below the left axilla. 6 mm raised scaly light tan lesion of the right upper forehead.    Mental Status - alert and oriented x 3. Normal mood and affect appropriate to mood.     Neck - supple without lymphadenopathy. Carotid pulses are normal without bruits. Thyroid is normal in midline without nodules.     Chest - lungs are clear to auscultation without rales, rhonchi or wheezes.     Heart - regular, rate and rhythm without murmurs, rubs or gallops. Grade 1/6 systolic ejection murmur hears best at the right " sternal border, 2nd ICS.     Abdomen - soft, obese, protuberant, nontender, nondistended. No masses, hepatomegaly or splenomegaly is noted. No rebound, rigidity or guarding is noted. Bowel sounds are normoactive.    Extremities - no cyanosis, clubbing or edema. Pedal pulses are 2+ normal at the dorsalis pedis and posterior pulses bilaterally.     Neurological - cranial nerves II through XII are grossly intact. Motor strength 5/5 at all fours.     Results:  Blood sugar of 179 in the ER.    Assessment/Plan   1. Class 3 severe obesity due to excess calories without serious comorbidity with body mass index (BMI) of 45.0 to 49.9 in adult (Multi)  semaglutide 0.25 mg or 0.5 mg (2 mg/3 mL) pen injector      2. Mixed hyperlipidemia  Follow Up [facility] - PCP - Established      3. Benign essential HTN  Follow Up [facility] - PCP - Established    aspirin 81 mg chewable tablet      4. Controlled type 2 diabetes mellitus with hyperglycemia, without long-term current use of insulin (Multi)  Follow Up [facility] - PCP - Established    aspirin 81 mg chewable tablet    semaglutide 0.25 mg or 0.5 mg (2 mg/3 mL) pen injector      5. Class 2 obesity due to excess calories without serious comorbidity with body mass index (BMI) of 38.0 to 38.9 in adult  Follow Up [facility] - PCP - Established      6. Hepatic steatosis  semaglutide 0.25 mg or 0.5 mg (2 mg/3 mL) pen injector        Plan:  Patient will continue on a diabetic, low-cholesterol diet and weight reduction. Exercise as tolerated. He will continue medications as prescribed. Follow-up in 4-6 week(s) otherwise as needed.    Patient will follow up with his cardiologist for further CV evaluation.    Patient is to return for fasting A1c, lipid panel, urine albumin, PSA, Hep C, HIV labs CBC, urine albumin at his convenience prior to his next appointment. Fasting is nothing to eat or drink except water or black coffee for 8-12  hours. Will call patient with results when available.    Patient was started on:   Ozempic (0.25 or 0.5 mg/dose), INJECT 0.25 MG WEEKLY x 4 weeks.  Ozempic (0.25 or 0.5 mg/dose), Then INJECT 0.5 MG WEEKLY.  Rx(s) sent to pharmacy.    Patient will decrease metformin to 500 mg twice daily.    Scribe Attestation  By signing my name below, IAaron Scribe, attest that this documentation has been prepared under the direction and in the presence of Darian Munguia MD.

## 2024-06-12 ENCOUNTER — HOSPITAL ENCOUNTER (OUTPATIENT)
Dept: CARDIOLOGY | Facility: CLINIC | Age: 64
Discharge: HOME | End: 2024-06-12
Payer: COMMERCIAL

## 2024-06-12 ENCOUNTER — HOSPITAL ENCOUNTER (OUTPATIENT)
Dept: RADIOLOGY | Facility: CLINIC | Age: 64
Discharge: HOME | End: 2024-06-12
Payer: COMMERCIAL

## 2024-06-12 VITALS — WEIGHT: 261 LBS | HEIGHT: 68 IN | BODY MASS INDEX: 39.56 KG/M2

## 2024-06-12 DIAGNOSIS — R07.89 OTHER CHEST PAIN: ICD-10-CM

## 2024-06-12 DIAGNOSIS — R06.02 SHORTNESS OF BREATH ON EXERTION: ICD-10-CM

## 2024-06-12 DIAGNOSIS — E78.2 MIXED HYPERLIPIDEMIA: ICD-10-CM

## 2024-06-12 DIAGNOSIS — I45.10 COMPLETE RIGHT BUNDLE BRANCH BLOCK (RBBB): ICD-10-CM

## 2024-06-12 DIAGNOSIS — R01.1 SYSTOLIC EJECTION MURMUR: ICD-10-CM

## 2024-06-12 LAB
AORTIC VALVE MEAN GRADIENT: 8 MMHG
AORTIC VALVE PEAK VELOCITY: 1.68 M/S
AV PEAK GRADIENT: 11.3 MMHG
AVA (PEAK VEL): 2.38 CM2
AVA (VTI): 2.72 CM2
EJECTION FRACTION APICAL 4 CHAMBER: 60.7
LEFT VENTRICLE INTERNAL DIMENSION DIASTOLE: 5.6 CM (ref 3.5–6)
LEFT VENTRICULAR OUTFLOW TRACT DIAMETER: 2.2 CM
LV EJECTION FRACTION BIPLANE: 60 %
MITRAL VALVE E/A RATIO: 0.94
MITRAL VALVE E/E' RATIO: 8.4
RIGHT VENTRICLE PEAK SYSTOLIC PRESSURE: 11.8 MMHG

## 2024-06-12 PROCEDURE — 93017 CV STRESS TEST TRACING ONLY: CPT

## 2024-06-12 PROCEDURE — 78452 HT MUSCLE IMAGE SPECT MULT: CPT

## 2024-06-12 PROCEDURE — 93306 TTE W/DOPPLER COMPLETE: CPT | Performed by: INTERNAL MEDICINE

## 2024-06-12 PROCEDURE — 3430000001 HC RX 343 DIAGNOSTIC RADIOPHARMACEUTICALS: Performed by: INTERNAL MEDICINE

## 2024-06-12 PROCEDURE — 75571 CT HRT W/O DYE W/CA TEST: CPT

## 2024-06-12 PROCEDURE — A9502 TC99M TETROFOSMIN: HCPCS | Performed by: INTERNAL MEDICINE

## 2024-06-12 PROCEDURE — 93306 TTE W/DOPPLER COMPLETE: CPT

## 2024-06-12 RX ORDER — METFORMIN HYDROCHLORIDE 500 MG/1
500 TABLET ORAL
COMMUNITY
End: 2024-06-16 | Stop reason: DRUGHIGH

## 2024-06-13 ENCOUNTER — HOSPITAL ENCOUNTER (OUTPATIENT)
Dept: RADIOLOGY | Facility: CLINIC | Age: 64
Discharge: HOME | End: 2024-06-13
Payer: COMMERCIAL

## 2024-06-13 PROCEDURE — 3430000001 HC RX 343 DIAGNOSTIC RADIOPHARMACEUTICALS: Performed by: INTERNAL MEDICINE

## 2024-06-13 PROCEDURE — A9502 TC99M TETROFOSMIN: HCPCS | Performed by: INTERNAL MEDICINE

## 2024-06-16 DIAGNOSIS — E11.65 CONTROLLED TYPE 2 DIABETES MELLITUS WITH HYPERGLYCEMIA, WITHOUT LONG-TERM CURRENT USE OF INSULIN (MULTI): ICD-10-CM

## 2024-06-16 RX ORDER — METFORMIN HYDROCHLORIDE 500 MG/1
1000 TABLET ORAL
Qty: 120 TABLET | Refills: 5 | Status: SHIPPED | OUTPATIENT
Start: 2024-06-16 | End: 2024-12-13

## 2024-06-17 ENCOUNTER — APPOINTMENT (OUTPATIENT)
Dept: CARDIOLOGY | Facility: CLINIC | Age: 64
End: 2024-06-17
Payer: COMMERCIAL

## 2024-06-17 VITALS
BODY MASS INDEX: 39.56 KG/M2 | WEIGHT: 261 LBS | HEIGHT: 68 IN | SYSTOLIC BLOOD PRESSURE: 122 MMHG | HEART RATE: 76 BPM | DIASTOLIC BLOOD PRESSURE: 82 MMHG

## 2024-06-17 DIAGNOSIS — I10 BENIGN ESSENTIAL HTN: ICD-10-CM

## 2024-06-17 DIAGNOSIS — I45.10 COMPLETE RIGHT BUNDLE BRANCH BLOCK (RBBB): ICD-10-CM

## 2024-06-17 DIAGNOSIS — Z78.9 NEVER SMOKED TOBACCO: ICD-10-CM

## 2024-06-17 DIAGNOSIS — R06.02 SHORTNESS OF BREATH ON EXERTION: ICD-10-CM

## 2024-06-17 DIAGNOSIS — E78.2 MIXED HYPERLIPIDEMIA: ICD-10-CM

## 2024-06-17 DIAGNOSIS — R01.1 SYSTOLIC EJECTION MURMUR: ICD-10-CM

## 2024-06-17 PROCEDURE — 3074F SYST BP LT 130 MM HG: CPT | Performed by: INTERNAL MEDICINE

## 2024-06-17 PROCEDURE — 99214 OFFICE O/P EST MOD 30 MIN: CPT | Performed by: INTERNAL MEDICINE

## 2024-06-17 PROCEDURE — 4010F ACE/ARB THERAPY RXD/TAKEN: CPT | Performed by: INTERNAL MEDICINE

## 2024-06-17 PROCEDURE — 1036F TOBACCO NON-USER: CPT | Performed by: INTERNAL MEDICINE

## 2024-06-17 PROCEDURE — 3008F BODY MASS INDEX DOCD: CPT | Performed by: INTERNAL MEDICINE

## 2024-06-17 PROCEDURE — 3079F DIAST BP 80-89 MM HG: CPT | Performed by: INTERNAL MEDICINE

## 2024-06-17 RX ORDER — ROSUVASTATIN CALCIUM 10 MG/1
10 TABLET, COATED ORAL DAILY
Qty: 90 TABLET | Refills: 3 | Status: SHIPPED | OUTPATIENT
Start: 2024-06-17 | End: 2025-06-17

## 2024-06-17 RX ORDER — ASCORBIC ACID 500 MG
500 TABLET ORAL DAILY
COMMUNITY

## 2024-06-17 RX ORDER — MULTIVITAMIN/IRON/FOLIC ACID 18MG-0.4MG
1 TABLET ORAL DAILY
COMMUNITY

## 2024-06-17 NOTE — PROGRESS NOTES
Patient:  Tucker Holguin  YOB: 1960  MRN: [id number]       Chief Complaint/Active Symptoms:       Tucker Holguin is a 63 y.o. male who returns today for cardiac follow-up.  Patient had undergone calcium score test echo and perfusion scan.  Testing came back fine.  His calcium score is elevated at over 700.  He nuclear scan and echo however showed no major abnormalities.  See reports below for details.  He is free of any major symptoms.  We recommended that he discontinue gemfibrozil and Zetia and go on to Crestor 10 daily.  In addition is going to do exercise and weight loss.  He will see me at 6-month intervals.      Objective:     Vitals:    06/17/24 1439   BP: 122/82   Pulse: 76       Vitals:    06/17/24 1439   Weight: 118 kg (261 lb)       Allergies:     Allergies   Allergen Reactions    Demeclocycline Hives    Penicillins Fever, Hallucinations and Hives          Medications:     Current Outpatient Medications   Medication Instructions    ascorbic acid (VITAMIN C) 500 mg, oral, Daily    aspirin 81 mg, oral, Daily    b complex 0.4 mg tablet 1 tablet, oral, Daily    blood sugar diagnostic (Accu-Chek Teetee Plus test strp) strip TEST ONCE D    dapagliflozin propanediol (FARXIGA) 10 mg, oral, Daily before breakfast    ezetimibe (ZETIA) 10 mg, oral, Daily    gemfibrozil (LOPID) 600 mg, oral, 2 times daily    lisinopril 20 mg, oral, Daily    metFORMIN (GLUCOPHAGE) 1,000 mg, oral, 2 times daily (morning and late afternoon)    pantoprazole (PROTONIX) 40 mg, oral, Daily before breakfast, Do not crush, chew, or split.    semaglutide 0.25 mg or 0.5 mg (2 mg/3 mL) pen injector Inject 0.25 mg under the skin 1 (one) time per week for 28 days, THEN 0.5 mg 1 (one) time per week.    zinc sulfate (ZINC-220 ORAL) 1 tablet, oral, Daily       Physical Examination:   Constitutional:       Appearance: Healthy appearance. Not in distress.   Neck:      Vascular: No JVR. JVD normal.   Pulmonary:      Effort: Pulmonary  "effort is normal.      Breath sounds: Normal breath sounds. No wheezing. No rhonchi. No rales.   Chest:      Chest wall: Not tender to palpatation.   Cardiovascular:      PMI at left midclavicular line. Normal rate. Regular rhythm. Normal S1. Normal S2.       Murmurs: There is no murmur.      No gallop.  No click. No rub.   Pulses:     Intact distal pulses.   Edema:     Peripheral edema absent.   Abdominal:      General: Bowel sounds are normal.      Palpations: Abdomen is soft.      Tenderness: There is no abdominal tenderness.   Musculoskeletal: Normal range of motion.         General: No tenderness. Skin:     General: Skin is warm and dry.   Neurological:      General: No focal deficit present.      Mental Status: Alert and oriented to person, place and time.            Lab:     CBC:   Lab Results   Component Value Date    WBC 4.8 05/03/2024    RBC 4.66 05/03/2024    HGB 13.6 05/03/2024    HCT 41.2 05/03/2024     05/03/2024        CMP:    Lab Results   Component Value Date     05/03/2024    K 4.3 05/03/2024     05/03/2024    CO2 25 05/03/2024    BUN 23 05/03/2024    CREATININE 1.11 05/03/2024    GLUCOSE 179 (H) 05/03/2024    CALCIUM 9.7 05/03/2024       Magnesium:    Lab Results   Component Value Date    MG 2.00 05/03/2024       Lipid Profile:    Lab Results   Component Value Date    TRIG 209 (H) 09/07/2022    HDL 37.0 (A) 09/07/2022       TSH:    No results found for: \"TSH\"    BNP:   Lab Results   Component Value Date    BNP 10 05/03/2024        PT/INR:    Lab Results   Component Value Date    PROTIME 11.7 05/03/2024    INR 1.0 05/03/2024       HgBA1c:    Lab Results   Component Value Date    HGBA1C 7.7 (A) 01/24/2023       BMP:  Lab Results   Component Value Date     05/03/2024     (L) 09/28/2023     09/13/2022     09/07/2022    K 4.3 05/03/2024    K 4.0 09/28/2023    K 4.3 09/13/2022    K 4.7 09/07/2022     05/03/2024     09/28/2023     09/13/2022    "  09/07/2022    CO2 25 05/03/2024    CO2 23 09/28/2023    CO2 25 09/13/2022    CO2 28 09/07/2022    BUN 23 05/03/2024    BUN 16 09/28/2023    BUN 23 09/13/2022    BUN 24 (H) 09/07/2022    CREATININE 1.11 05/03/2024    CREATININE 1.00 09/28/2023    CREATININE 1.20 09/13/2022    CREATININE 1.00 09/07/2022       CBC:  Lab Results   Component Value Date    WBC 4.8 05/03/2024    WBC 9.4 09/28/2023    WBC 10.2 09/13/2022    WBC 6.1 09/07/2022    RBC 4.66 05/03/2024    RBC 4.71 09/28/2023    RBC 4.28 (L) 09/13/2022    RBC 4.92 09/07/2022    HGB 13.6 05/03/2024    HGB 13.7 09/28/2023    HGB 12.1 (L) 09/13/2022    HGB 14.3 09/07/2022    HCT 41.2 05/03/2024    HCT 41.1 09/28/2023    HCT 37.2 (L) 09/13/2022    HCT 44.2 09/07/2022    MCV 88 05/03/2024    MCV 87 09/28/2023    MCV 87 09/13/2022    MCV 90 09/07/2022    MCH 29.2 05/03/2024    MCHC 33.0 05/03/2024    MCHC 33.3 09/28/2023    MCHC 32.5 09/13/2022    MCHC 32.4 09/07/2022    RDW 14.0 05/03/2024    RDW 14.3 09/28/2023    RDW 13.6 09/13/2022    RDW 14.2 09/07/2022     05/03/2024     09/28/2023     09/13/2022     09/07/2022       Cardiac Enzymes:    Lab Results   Component Value Date    TROPHS 5 05/03/2024    TROPHS 6 05/03/2024    TROPHS 3 09/28/2023       Hepatic Function Panel:    Lab Results   Component Value Date    ALKPHOS 83 05/03/2024    ALT 43 05/03/2024    AST 23 05/03/2024    PROT 7.3 05/03/2024    BILITOT 0.5 05/03/2024    BILIDIR 0.0 09/28/2023         Diagnostic Studies:     Nuclear Stress Test    Result Date: 6/13/2024  Interpreted By:  Home Schafer and Arthur Skyler STUDY: MYOCARDIAL PERFUSION STRESS TEST WITH EXERCISE   Performing facility: [facility], [address] [place] [address] [id number] Provider:  Home Schafer DO, FACC PCP:  Dr. CHAYITO SMITH Supervising provider:  Russel Thibodeaux MD, FACC   INDICATION: RBBB  SOBOE  OTHER CHEST PAIN   HISTORY: Gender:  M; Age:   62 y/o ; Height:  .7 cm cm; Weight:   .389 kg kg.   High Cholesterol;  Diabetes;  HTN;  Chest Pain;  SOB;  Abnormal EKG; RBBB  Family HX CAD;  BILATERAL KNEE SURGERY;   Denies smoking.   COMPARISON: No comparison.     ACCESSION NUMBER(S): [id number]   ORDERING CLINICIAN: DANIELLE GAMBINO   TECHNIQUE: TWO DAY protocol. Stress injection: Date:06/12/24, 34.6 mCi of Myoview IV at peak exercise. Rest injection: Date: 06/13/24, 32.5 mCi of Myoview IV at rest. Imaging was performed by  gated tomographic technique.   STRESS TEST DATA: Resting heart rate was 70 BPM. Resting blood pressure was 120/74 mmHg. The patient exercised using a  Long exercise protocol. 6:30 minutes exercised. 92 % of MPHR achieved for age. 7.7 METS achieved. Maximum heart rate was 146 BPM. Maximum blood pressure was 198/82 mmHg. DTS 6. TEST TERMINATED DUE TO: Dyspnea / Leg Tightness   FINDINGS: STRESS TEST RESULTS:   Resting electrocardiogram revealed sinus rhythm with right bundle branch block pattern. The patient had no significant ECG changes with maximal stress. The patient did not have chest pains/symptoms during the procedure. There was a normal recovery phase. There were no significant dysrhythmias.   IMAGING RESULTS:   Image quality was good. Rest and stress tomographic images were reviewed and revealed normal perfusion without evidence of ischemia, myocardial infarction, or left ventricular dilatation with stress. Overall left ventricular systolic function appeared to be normal without regional wall motion abnormalities. LV ejection fraction was 50 %. Blunted volume curve noted TID is 0.92 and is normal. There was not evidence of  attenuation artifact.       Normal exercise Myoview cardiac perfusion stress test. No evidence of ischemia or myocardial infarction by perfusion imaging. Normal left ventricular systolic function, ejection fraction 50%. No exercise provoked significant ischemic ECG changes or chest pain symptoms. The  "above study is normal. There is no significant ischemia infarct or wall motion abnormalities identified.   Signed by: Danielle Schafer 6/13/2024 3:38 PM Dictation workstation:   [id number]      EKG:   No results found for: \"EKG\"                 [facility]  [address], [address], [place] [address]           Tel [phone] Fax [phone]     TRANSTHORACIC ECHOCARDIOGRAM REPORT        Patient Name:      LAST Zeng Physician:    [id number] Danielle Schafer DO  Study Date:        6/12/2024            Ordering Provider:    [id number] DANIELLE SCHAFER  MRN/PID:           [id number]             Fellow:  Accession#:        [id number]         Nurse:  Date of Birth/Age: 1960 / 63 years Sonographer:          Danielle William  Gender:            M                    Additional Staff:  Height:            172.72 cm            Admit Date:           6/12/2024  Weight:            118.39 kg            Admission Status:     Outpatient  BSA / BMI:         2.29 m2 / 39.69      Department Location:  [facility]                     kg/m2                                      [facility]  Blood Pressure: 140 /80 mmHg     Study Type:    TRANSTHORACIC ECHO (TTE) COMPLETE  Diagnosis/ICD: Unspecified right bundle branch block-I45.10  Indication:    RBBB  CPT Codes:     Echo Complete w Full Doppler-70045     Patient History:  Diabetes:          Yes  Pertinent History: Chest Pain, HTN, Hyperlipidemia, Murmur and RBBB, SOB.     Study Detail: The following Echo studies were performed: 2D, M-Mode, Doppler and                color flow. The patient was awake.        PHYSICIAN INTERPRETATION:  Left Ventricle: Left ventricular systolic function is normal, with an estimated ejection fraction of 55-60%. There are no regional wall motion abnormalities. The left ventricular cavity size " is normal. There is mildly increased left ventricular posterior wall thickness. Spectral Doppler shows an impaired relaxation pattern of left ventricular diastolic filling.  LV Wall Scoring:  All segments are normal.     Left Atrium: The left atrium is mildly dilated.  Right Ventricle: The right ventricle is normal in size. There is normal right ventricular global systolic function.  Right Atrium: The right atrium is normal in size.  Aortic Valve: The aortic valve appears structurally normal. The aortic valve appears tricuspid. There is mild aortic valve cusp calcification. There is no evidence of aortic valve stenosis.  There is no evidence of aortic valve regurgitation. The peak instantaneous gradient of the aortic valve is 11.3 mmHg. The mean gradient of the aortic valve is 8.0 mmHg.  Mitral Valve: The mitral valve is normal in structure. There is no evidence of mitral valve stenosis. The doppler estimated mean and peak diastolic pressure gradients are 1.0 mmHg and 2.6 mmHg respectively. There is normal mitral valve leaflet mobility. There is mild mitral valve regurgitation.  Tricuspid Valve: The tricuspid valve is structurally normal. There is normal tricuspid valve leaflet mobility. There is mild tricuspid regurgitation.  Pulmonic Valve: The pulmonic valve is structurally normal. There is no indication of pulmonic valve regurgitation.  Pericardium: There is no pericardial effusion noted.  Aorta: The aortic root is normal.  Pulmonary Artery: The main pulmonary artery is normal in size, and position, with normal bifurcation into the left and right pulmonary arteries.  Systemic Veins: The inferior vena cava appears to be of normal size.  In comparison to the previous echocardiogram(s): The left ventricular function is unchanged. The left ventricular hypertrophy is unchanged. The left ventricular diastolic function is worse.        CONCLUSIONS:   1. Left ventricular systolic function is normal with a 55-60%  estimated ejection fraction.   2. Spectral Doppler shows an impaired relaxation pattern of left ventricular diastolic filling.   3. There is no evidence of mitral valve stenosis.   4. Mild mitral valve regurgitation.   5. Mild tricuspid regurgitation is visualized.   6. Aortic valve stenosis is not present.   7. The main pulmonary artery is normal in size, and position, with normal bifurcation into the left and right pulmonary arteries.  Radiology:     No orders to display   Interpreted By:  Venancio Kwon,   STUDY:  CT CARDIAC SCORING WO IV CONTRAST;  6/12/2024 2:31 pm      INDICATION:  Signs/Symptoms:increased lipid, cp.      COMPARISON:  None.      ACCESSION NUMBER(S):  [id number]      ORDERING CLINICIAN:  DANIELLE GAMBINO      TECHNIQUE:  Using prospective ECG gating, CT scan of the coronary arteries was  performed without intravenous contrast. Coronary calcium scoring  was  performed according to the method of Agatston.      FINDINGS:  The score and distribution of calcium in the coronary arteries is as  follows:      LM: 0.  LAD: 615.  LCx: 45.  RCA: 46.      Total: 706.      The visualized segments of the lungs are normally expanded.      The visualized mid/lower ascending thoracic aorta measures 3.0 cm in  diameter.      The heart is mildly enlarged. No pericardial effusion is present.      No gross evidence of mediastinal or hilar lymphadenopathy is  identified.      Partially imaged hepatic steatosis.      IMPRESSION:  1. Coronary artery calcium score of 706*.  2. Hepatic steatosis.      *Coronary artery calcium scoring may be helpful in predicting the  risk for future coronary heart disease events.  According to the  American College of Cardiology Foundation Clinical Expert Consensus  Task Force, such testing provides important prognostic information in  patients with more than one coronary heart disease risk factor. The  coronary artery calcium score correlates with the annual risk of a  non-fatal  "myocardial infarction or coronary heart disease death.      Coronary artery score            Annual Risk      0-99                             0.4%  100-399                        1.3%  >400                            2.4%      These three \"breakpoints\" correspond to lower, intermediate and high  risk states for future coronary events.  Such information should be  used, along with appropriate clinical judgment, to make decisions  regarding the intensity of risk factor management strategies to treat  blood lipids and to modify other non-lipid coronary risk factors.      Reference: Kirkwood P et al. Circulation.  2007; 115:402-426      MACRO:  None.      Signed by: Venancio Kwon 6/17/2024 8:59 AM  Dictation workstation:   [id number]    Assessment/Plan:         Patient Active Problem List   Diagnosis    Complete right bundle branch block (RBBB)    Contact dermatitis    Controlled type 2 diabetes mellitus with hyperglycemia, without long-term current use of insulin (Multi)    History of total knee replacement    Benign essential HTN    Mixed hyperlipidemia    Persistent cough    Primary osteoarthritis of both knees    Syndactyly of toes of both feet    Systolic ejection murmur    Wheezing    Acute diverticulitis    Osteoarthritis of right knee    Primary osteoarthritis of left knee    Shortness of breath on exertion    Class 2 obesity due to excess calories without serious comorbidity with body mass index (BMI) of 38.0 to 38.9 in adult    BMI 39.0-39.9,adult    Never smoked tobacco    Other chest pain         ASSESSMENT       63-year-old gentleman here for evaluation of atypical chest pain.     Meds, vitals, examination as noted.     Chart reviewed in detail discussed the patient at length including recent emergency room visit and dated pain during that study included normal BNP and normal troponins and unchanged EKG.     Impression:  Atypical chest pain  Non-insulin-dependent diabetes " mellitus  Hyperlipidemia  Essential hypertension  Obesity  Family history of coronary disease  Bilateral knee replacements  Abnormal EKG with chronic right bundle branch block pattern  Coronary disease based on elevated calcium score of 700  Normal echo and perfusion scan  PLAN     Recommendation:  Switch to Crestor 10 daily discontinuing gemfibrozil and Zetia  Continue aspirin and other medicines for diabetes  Exercise and weight loss  See me at 6-month intervals  Call should any other issues or problems arise

## 2024-07-15 ENCOUNTER — LAB (OUTPATIENT)
Dept: LAB | Facility: LAB | Age: 64
End: 2024-07-15
Payer: COMMERCIAL

## 2024-07-15 DIAGNOSIS — Z11.4 SCREENING FOR HIV WITHOUT PRESENCE OF RISK FACTORS: ICD-10-CM

## 2024-07-15 DIAGNOSIS — E78.2 MIXED HYPERLIPIDEMIA: ICD-10-CM

## 2024-07-15 DIAGNOSIS — Z11.59 ENCOUNTER FOR HEPATITIS C SCREENING TEST FOR LOW RISK PATIENT: ICD-10-CM

## 2024-07-15 DIAGNOSIS — Z12.5 SCREENING PSA (PROSTATE SPECIFIC ANTIGEN): ICD-10-CM

## 2024-07-15 DIAGNOSIS — E11.65 CONTROLLED TYPE 2 DIABETES MELLITUS WITH HYPERGLYCEMIA, WITHOUT LONG-TERM CURRENT USE OF INSULIN (MULTI): ICD-10-CM

## 2024-07-15 LAB
CHOLEST SERPL-MCNC: 147 MG/DL (ref 0–199)
CHOLESTEROL/HDL RATIO: 4.2
CREAT UR-MCNC: 110.5 MG/DL (ref 20–370)
EST. AVERAGE GLUCOSE BLD GHB EST-MCNC: 203 MG/DL
HBA1C MFR BLD: 8.7 %
HCV AB SER QL: NONREACTIVE
HDLC SERPL-MCNC: 34.9 MG/DL
HIV 1+2 AB+HIV1 P24 AG SERPL QL IA: NONREACTIVE
LDLC SERPL CALC-MCNC: 69 MG/DL
MICROALBUMIN UR-MCNC: <7 MG/L
MICROALBUMIN/CREAT UR: NORMAL MG/G{CREAT}
NON HDL CHOLESTEROL: 112 MG/DL (ref 0–149)
PSA SERPL-MCNC: 0.35 NG/ML
TRIGL SERPL-MCNC: 217 MG/DL (ref 0–149)
VLDL: 43 MG/DL (ref 0–40)

## 2024-07-15 PROCEDURE — 82043 UR ALBUMIN QUANTITATIVE: CPT

## 2024-07-15 PROCEDURE — 87389 HIV-1 AG W/HIV-1&-2 AB AG IA: CPT

## 2024-07-15 PROCEDURE — 36415 COLL VENOUS BLD VENIPUNCTURE: CPT

## 2024-07-15 PROCEDURE — 83036 HEMOGLOBIN GLYCOSYLATED A1C: CPT

## 2024-07-15 PROCEDURE — 84153 ASSAY OF PSA TOTAL: CPT

## 2024-07-15 PROCEDURE — 86803 HEPATITIS C AB TEST: CPT

## 2024-07-15 PROCEDURE — 82570 ASSAY OF URINE CREATININE: CPT

## 2024-07-15 PROCEDURE — 80061 LIPID PANEL: CPT

## 2024-07-16 DIAGNOSIS — E11.65 CONTROLLED TYPE 2 DIABETES MELLITUS WITH HYPERGLYCEMIA, WITHOUT LONG-TERM CURRENT USE OF INSULIN (MULTI): ICD-10-CM

## 2024-07-16 RX ORDER — DAPAGLIFLOZIN 10 MG/1
10 TABLET, FILM COATED ORAL
Qty: 90 TABLET | Refills: 3 | Status: SHIPPED | OUTPATIENT
Start: 2024-07-16

## 2024-07-16 NOTE — TELEPHONE ENCOUNTER
Rx Refill Request     Name: Tucker Holguin  :  1960   Medication Name:  farxiga   Specific Pharmacy location:  [facility]   Date of last appointment:  2024  Date of next appointment:  2024  Best number to reach patient:  [phone]

## 2024-07-18 ENCOUNTER — APPOINTMENT (OUTPATIENT)
Dept: PRIMARY CARE | Facility: CLINIC | Age: 64
End: 2024-07-18
Payer: COMMERCIAL

## 2024-07-18 VITALS
HEIGHT: 68 IN | TEMPERATURE: 97.2 F | WEIGHT: 257 LBS | RESPIRATION RATE: 16 BRPM | HEART RATE: 84 BPM | OXYGEN SATURATION: 97 % | SYSTOLIC BLOOD PRESSURE: 124 MMHG | BODY MASS INDEX: 38.95 KG/M2 | DIASTOLIC BLOOD PRESSURE: 82 MMHG

## 2024-07-18 DIAGNOSIS — E78.2 MIXED HYPERLIPIDEMIA: ICD-10-CM

## 2024-07-18 DIAGNOSIS — E11.65 CONTROLLED TYPE 2 DIABETES MELLITUS WITH HYPERGLYCEMIA, WITHOUT LONG-TERM CURRENT USE OF INSULIN (MULTI): Primary | ICD-10-CM

## 2024-07-18 DIAGNOSIS — I10 BENIGN ESSENTIAL HTN: ICD-10-CM

## 2024-07-18 PROCEDURE — 3062F POS MACROALBUMINURIA REV: CPT | Performed by: FAMILY MEDICINE

## 2024-07-18 PROCEDURE — 3052F HG A1C>EQUAL 8.0%<EQUAL 9.0%: CPT | Performed by: FAMILY MEDICINE

## 2024-07-18 PROCEDURE — 3008F BODY MASS INDEX DOCD: CPT | Performed by: FAMILY MEDICINE

## 2024-07-18 PROCEDURE — 3074F SYST BP LT 130 MM HG: CPT | Performed by: FAMILY MEDICINE

## 2024-07-18 PROCEDURE — 3079F DIAST BP 80-89 MM HG: CPT | Performed by: FAMILY MEDICINE

## 2024-07-18 PROCEDURE — 99214 OFFICE O/P EST MOD 30 MIN: CPT | Performed by: FAMILY MEDICINE

## 2024-07-18 PROCEDURE — 4010F ACE/ARB THERAPY RXD/TAKEN: CPT | Performed by: FAMILY MEDICINE

## 2024-07-18 PROCEDURE — 3048F LDL-C <100 MG/DL: CPT | Performed by: FAMILY MEDICINE

## 2024-07-18 PROCEDURE — 1036F TOBACCO NON-USER: CPT | Performed by: FAMILY MEDICINE

## 2024-07-18 ASSESSMENT — ENCOUNTER SYMPTOMS
BLURRED VISION: 0
POLYPHAGIA: 0
VISUAL CHANGE: 0
HUNGER: 0
TREMORS: 0
FATIGUE: 0
HEADACHES: 0
DIZZINESS: 0
NERVOUS/ANXIOUS: 0
WEIGHT LOSS: 0
CONFUSION: 0
WEAKNESS: 0
POLYDIPSIA: 0
SPEECH DIFFICULTY: 0
BLACKOUTS: 0
SEIZURES: 0
SWEATS: 0

## 2024-07-18 NOTE — PROGRESS NOTES
Subjective   Patient ID: Tucker Holguin is a 63 y.o. male who presents for Follow-up (Mixed hyperlipidemia; Benign essential HTN; Controlled type 2 diabetes mellitus with hyperglycemia, without long-term current use of insulin (Multi); Class 2 obesity due to excess calories without serious comorbidity with body mass index (BMI) of 38.0 to 38.9 in adult). I last saw the patient on 6/6/2024      Patient is here for a F/U    Last A1C 7/15 = 8.7 %  Review recent labs    Past medical, surgical, and family history reviewed.  Reviewed and documented all medications   Pt eating well, exercising as tolerated and taking medications as directed    Has no medical concerns for rooming MA. Patient checked his blood sugar and found it to be 135 mg/dL. No significant changes in weight. Patient denies nausea and constipation. He is trying to stay physically active. He was prescribed Crestor by his cardiologist Dr. Schafer. He started taking metformin 2 tablets twice daily after he learned of his hemoglobin A1c results. There is family history of diabetes. His brother had uncontrolled diabetes and passed from heart and kidney failure. No refills needed.     ROS  Except positives as noted in the CC & HPI  Constitutional: Denies fevers, chills, night sweats, fatigue, weight changes, change in appetite    Eyes: Denies blurry vision, double vision    ENT: Denies otalgia, trouble hearing, tinnitus, vertigo, nasal congestion, rhinorrhea, sore throat    Neck: Denies swelling, masses    Cardiovascular: Denies chest pain, palpitations, edema, orthopnea, syncope    Respiratory: Denies dyspnea, cough, wheezing, postural nocturnal dyspnea    Gastrointestinal: Denies abdominal pain, nausea, vomiting, diarrhea, constipation, melena, hematochezia    Genitourinary: Denies dysuria, hematuria, frequency, urgency    Musculoskeletal: Denies back pain, neck pain, arthralgias, myalgias    Integumentary: Denies skin lesions, rashes, masses   "  Neurological: Denies dizziness, headaches, confusion, limb weakness, paresthesias, syncope, convulsions    Psychiatric: Denies depression, anxiety, homicidal ideations, suicidal ideations, sleep disturbances    Endocrine: Denies polyphagia, polydipsia, polyuria, weakness, hair thinning, heat intolerance, cold intolerance, weight changes    Heme/Lymph: Denies easy bruising, easy bleeding, swollen glands    Objective   /82 (BP Location: Left arm)   Pulse 84   Temp 36.2 °C (97.2 °F)   Resp 16   Ht 1.727 m (5' 8\")   Wt 117 kg (257 lb)   SpO2 97%   BMI 39.08 kg/m²     Physical Exam  132/70 on recheck of BP in the right arm.    General Appearance - well-developed, well-nourished, 63 y.o., White male in no acute distress.     Skin - Light tan, slightly raised skin lesion of the infraaxillary region. Warm, pink and dry without rash or concerning lesions. No perceptible lesion below the left axilla. 6 mm raised scaly light tan lesion of the right upper forehead.    Mental Status - alert and oriented x 3. Normal mood and affect appropriate to mood.     Neck - supple without lymphadenopathy. Carotid pulses are normal without bruits. Thyroid is normal in midline without nodules.     Chest - lungs are clear to auscultation without rales, rhonchi or wheezes.     Heart - regular, rate and rhythm without murmurs, rubs or gallops. Grade 1/6 systolic ejection murmur hears best at the right sternal border, 2nd ICS.     Abdomen - soft, obese, protuberant, nontender, nondistended. No masses, hepatomegaly or splenomegaly is noted. No rebound, rigidity or guarding is noted. Bowel sounds are normoactive.    Extremities - no cyanosis, clubbing or edema. Pedal pulses are 2+ normal at the dorsalis pedis and posterior pulses bilaterally.     Neurological - cranial nerves II through XII are grossly intact. Motor strength 5/5 at all fours.       Assessment/Plan   1. Controlled type 2 diabetes mellitus with hyperglycemia, without " long-term current use of insulin (Multi)  semaglutide (OZEMPIC) 1 mg/dose (4 mg/3 mL) pen injector    Comprehensive Metabolic Panel    Hemoglobin A1C      2. Benign essential HTN        3. Mixed hyperlipidemia        4. BMI 39.0-39.9,adult            Plan:  For weight management I recommend exercising and remaining physically active. Try to maintain a heathy diet that includes green leafy vegetables, fruits and proteins. You are encouraged to stay well hydrated.    Patient was advised to limit their salt intake and to not add any extra salt to their food. Patient is to avoid pretzels, chips, lunch meats, canned soups, soda pop, ham, zarco, hot dogs, etc.    Patient will continue on a diabetic, low-cholesterol diet and weight reduction. Exercise as tolerated. Will continue medications as prescribed. Follow-up in 3 month(s) otherwise as needed.     Patient was given refill(s) on:   Semaglutide 1 mg     Rx(s) sent to pharmacy.    Will obtain Comprehensive Metabolic and Hemoglobin A1c prior to patient's next appointment. Will call patient with results when available.    Patient to continue current medications (with any exceptions as noted) and diet. Follow-up in 3 month(s) otherwise as needed.     Scribe Attestation  By signing my name below, IKatty Scribe attest that this documentation has been prepared under the direction and in the presence of Darian Munguia MD.

## 2024-08-15 ENCOUNTER — APPOINTMENT (OUTPATIENT)
Dept: PRIMARY CARE | Facility: CLINIC | Age: 64
End: 2024-08-15
Payer: COMMERCIAL

## 2024-10-11 DIAGNOSIS — E11.65 CONTROLLED TYPE 2 DIABETES MELLITUS WITH HYPERGLYCEMIA, WITHOUT LONG-TERM CURRENT USE OF INSULIN: ICD-10-CM

## 2024-10-11 NOTE — TELEPHONE ENCOUNTER
Rx Refill Request Telephone Encounter    Name:  Tucker Holguin  :  [id number]  Medication Name:  semaglutide (OZEMPIC) 1 mg/dose (4 mg/3 mL) pen injector ; last sent by pcp   Specific Pharmacy location:  [facility]  Date of last appointment:    Date of next appointment:    Best number to reach patient:  [phone]    Please advise.

## 2024-10-17 ENCOUNTER — APPOINTMENT (OUTPATIENT)
Dept: PRIMARY CARE | Facility: CLINIC | Age: 64
End: 2024-10-17
Payer: COMMERCIAL

## 2024-11-12 ENCOUNTER — LAB (OUTPATIENT)
Dept: LAB | Facility: LAB | Age: 64
End: 2024-11-12
Payer: COMMERCIAL

## 2024-11-12 ENCOUNTER — APPOINTMENT (OUTPATIENT)
Dept: PRIMARY CARE | Facility: CLINIC | Age: 64
End: 2024-11-12
Payer: COMMERCIAL

## 2024-11-12 VITALS
RESPIRATION RATE: 16 BRPM | DIASTOLIC BLOOD PRESSURE: 72 MMHG | BODY MASS INDEX: 38.04 KG/M2 | HEIGHT: 68 IN | SYSTOLIC BLOOD PRESSURE: 128 MMHG | TEMPERATURE: 98.4 F | WEIGHT: 251 LBS | HEART RATE: 72 BPM | OXYGEN SATURATION: 97 %

## 2024-11-12 DIAGNOSIS — E11.65 CONTROLLED TYPE 2 DIABETES MELLITUS WITH HYPERGLYCEMIA, WITHOUT LONG-TERM CURRENT USE OF INSULIN: ICD-10-CM

## 2024-11-12 DIAGNOSIS — I10 BENIGN ESSENTIAL HTN: ICD-10-CM

## 2024-11-12 DIAGNOSIS — K21.9 GASTROESOPHAGEAL REFLUX DISEASE WITHOUT ESOPHAGITIS: ICD-10-CM

## 2024-11-12 DIAGNOSIS — Z23 FLU VACCINE NEED: ICD-10-CM

## 2024-11-12 DIAGNOSIS — E11.65 CONTROLLED TYPE 2 DIABETES MELLITUS WITH HYPERGLYCEMIA, WITHOUT LONG-TERM CURRENT USE OF INSULIN: Primary | ICD-10-CM

## 2024-11-12 DIAGNOSIS — E78.2 MIXED HYPERLIPIDEMIA: ICD-10-CM

## 2024-11-12 LAB
ALBUMIN SERPL BCP-MCNC: 4.5 G/DL (ref 3.4–5)
ALP SERPL-CCNC: 97 U/L (ref 33–136)
ALT SERPL W P-5'-P-CCNC: 33 U/L (ref 10–52)
ANION GAP SERPL CALC-SCNC: 11 MMOL/L (ref 10–20)
AST SERPL W P-5'-P-CCNC: 19 U/L (ref 9–39)
BILIRUB SERPL-MCNC: 0.5 MG/DL (ref 0–1.2)
BUN SERPL-MCNC: 16 MG/DL (ref 6–23)
CALCIUM SERPL-MCNC: 9.2 MG/DL (ref 8.6–10.3)
CHLORIDE SERPL-SCNC: 103 MMOL/L (ref 98–107)
CO2 SERPL-SCNC: 31 MMOL/L (ref 21–32)
CREAT SERPL-MCNC: 0.99 MG/DL (ref 0.5–1.3)
EGFRCR SERPLBLD CKD-EPI 2021: 86 ML/MIN/1.73M*2
EST. AVERAGE GLUCOSE BLD GHB EST-MCNC: 146 MG/DL
GLUCOSE SERPL-MCNC: 110 MG/DL (ref 74–99)
HBA1C MFR BLD: 6.7 %
POTASSIUM SERPL-SCNC: 4.3 MMOL/L (ref 3.5–5.3)
PROT SERPL-MCNC: 7.1 G/DL (ref 6.4–8.2)
SODIUM SERPL-SCNC: 141 MMOL/L (ref 136–145)

## 2024-11-12 PROCEDURE — 99214 OFFICE O/P EST MOD 30 MIN: CPT | Performed by: FAMILY MEDICINE

## 2024-11-12 PROCEDURE — 36415 COLL VENOUS BLD VENIPUNCTURE: CPT

## 2024-11-12 PROCEDURE — 3048F LDL-C <100 MG/DL: CPT | Performed by: FAMILY MEDICINE

## 2024-11-12 PROCEDURE — 80053 COMPREHEN METABOLIC PANEL: CPT

## 2024-11-12 PROCEDURE — 4010F ACE/ARB THERAPY RXD/TAKEN: CPT | Performed by: FAMILY MEDICINE

## 2024-11-12 PROCEDURE — 83036 HEMOGLOBIN GLYCOSYLATED A1C: CPT

## 2024-11-12 PROCEDURE — 3078F DIAST BP <80 MM HG: CPT | Performed by: FAMILY MEDICINE

## 2024-11-12 PROCEDURE — 3008F BODY MASS INDEX DOCD: CPT | Performed by: FAMILY MEDICINE

## 2024-11-12 PROCEDURE — 3044F HG A1C LEVEL LT 7.0%: CPT | Performed by: FAMILY MEDICINE

## 2024-11-12 PROCEDURE — 3074F SYST BP LT 130 MM HG: CPT | Performed by: FAMILY MEDICINE

## 2024-11-12 PROCEDURE — 1036F TOBACCO NON-USER: CPT | Performed by: FAMILY MEDICINE

## 2024-11-12 PROCEDURE — 90471 IMMUNIZATION ADMIN: CPT | Performed by: FAMILY MEDICINE

## 2024-11-12 PROCEDURE — 90673 RIV3 VACCINE NO PRESERV IM: CPT | Performed by: FAMILY MEDICINE

## 2024-11-12 PROCEDURE — 3062F POS MACROALBUMINURIA REV: CPT | Performed by: FAMILY MEDICINE

## 2024-11-12 RX ORDER — FAMOTIDINE 40 MG/1
40 TABLET, FILM COATED ORAL 2 TIMES DAILY
Qty: 60 TABLET | Refills: 5 | Status: SHIPPED | OUTPATIENT
Start: 2024-11-12 | End: 2025-05-11

## 2024-11-12 ASSESSMENT — PATIENT HEALTH QUESTIONNAIRE - PHQ9
1. LITTLE INTEREST OR PLEASURE IN DOING THINGS: NOT AT ALL
SUM OF ALL RESPONSES TO PHQ9 QUESTIONS 1 AND 2: 0
2. FEELING DOWN, DEPRESSED OR HOPELESS: NOT AT ALL

## 2024-11-12 NOTE — PROGRESS NOTES
Subjective   Patient ID: Tucker Holguin is a 64 y.o. male who presents for Follow-up (Mixed hyperlipidemia; Benign essential HTN; Controlled type 2 diabetes mellitus with hyperglycemia, without long-term current use of insulin; Class 2 obesity due to excess calories without serious comorbidity with body mass index (BMI) of 38.0 to 38.9 in adult/). I last saw the patient on 7/18/2024    Pt has active labs and is fasting to be done today.  Patient is here for a F/U  Pt said yes to flu vaccine, rooming MA administered    Past medical, surgical, and family history reviewed.  Reviewed and documented all medications   Pt eating well, exercising as tolerated and taking medications as directed    Patient requests for a refill on his Ozempic, metformin and pantoprazole. Patient does note that pantoprazole is fairly expensive and he would like to try an alternative. He notes having reflux especially after eating food. He denies nausea/vomiting/bowel & bladder irregularities.      ROS  Except positives as noted in the CC & HPI  Constitutional: Denies fevers, chills, night sweats, fatigue, weight changes, change in appetite    Eyes: Denies blurry vision, double vision    ENT: Denies otalgia, trouble hearing, tinnitus, vertigo, nasal congestion, rhinorrhea, sore throat    Neck: Denies swelling, masses    Cardiovascular: Denies chest pain, palpitations, edema, orthopnea, syncope    Respiratory: Denies dyspnea, cough, wheezing, postural nocturnal dyspnea    Gastrointestinal: Denies abdominal pain, nausea, vomiting, diarrhea, constipation, melena, hematochezia    Genitourinary: Denies dysuria, hematuria, frequency, urgency    Musculoskeletal: Denies back pain, neck pain, arthralgias, myalgias    Integumentary: Denies skin lesions, rashes, masses    Neurological: Denies dizziness, headaches, confusion, limb weakness, paresthesias, syncope, convulsions    Psychiatric: Denies depression, anxiety, homicidal ideations, suicidal  "ideations, sleep disturbances    Endocrine: Denies polyphagia, polydipsia, polyuria, weakness, hair thinning, heat intolerance, cold intolerance, weight changes    Heme/Lymph: Denies easy bruising, easy bleeding, swollen glands    Objective   /72 (BP Location: Right arm, Patient Position: Sitting, BP Cuff Size: Adult long)   Pulse 72   Temp 36.9 °C (98.4 °F)   Resp 16   Ht 1.727 m (5' 8\")   Wt 114 kg (251 lb)   SpO2 97%   BMI 38.16 kg/m²     Physical Exam  128/72 on recheck of BP in the right arm.    General Appearance - well-developed, well-nourished, 64 y.o., White male in no acute distress.     Skin - Warm, pink and dry without rash or concerning lesions.     Mental Status - alert and oriented x 3. Normal mood and affect appropriate to mood.     Neck - supple without lymphadenopathy. Carotid pulses are normal without bruits. Thyroid is normal in midline without nodules.     Chest - lungs are clear to auscultation without rales, rhonchi or wheezes.     Heart - regular, rate and rhythm without murmurs, rubs or gallops. Grade 1/6 systolic ejection murmur hears best at the right sternal border, 2nd ICS.     Abdomen - soft, obese, protuberant, nontender, nondistended. No masses, hepatomegaly or splenomegaly is noted. No rebound, rigidity or guarding is noted. Bowel sounds are normoactive.    Extremities - no cyanosis, clubbing or edema. Pedal pulses are 2+ normal at the dorsalis pedis and posterior pulses bilaterally.     Neurological - cranial nerves II through XII are grossly intact. Motor strength 5/5 at all fours.       Assessment/Plan   1. Controlled type 2 diabetes mellitus with hyperglycemia, without long-term current use of insulin        2. Benign essential HTN        3. Gastroesophageal reflux disease without esophagitis  famotidine (Pepcid) 40 mg tablet      4. Mixed hyperlipidemia        5. Flu vaccine need  Flu vaccine, trivalent, preservative free, no egg protein, age 18y+ (Flublok)    "       Plan:  Patient will continue on a diabetic, low-cholesterol diet and weight reduction. Exercise as tolerated. Patient will continue medications as prescribed. Follow-up in 3-6 month(s) otherwise as needed.      Patient was started on:   Famotidine 40 mg, TAKE 1 TAB BY MOUTH ONCE- TWICE DAILY     Rx(s) sent to pharmacy.      Flu vaccine given in the office today.     Scribe Attestation  By signing my name below, IDonna Scribe   attest that this documentation has been prepared under the direction and in the presence of Darian Munguia MD.

## 2024-12-16 ENCOUNTER — APPOINTMENT (OUTPATIENT)
Dept: CARDIOLOGY | Facility: CLINIC | Age: 64
End: 2024-12-16
Payer: COMMERCIAL

## 2024-12-19 ENCOUNTER — APPOINTMENT (OUTPATIENT)
Dept: CARDIOLOGY | Facility: CLINIC | Age: 64
End: 2024-12-19
Payer: COMMERCIAL

## 2025-01-16 DIAGNOSIS — E11.65 CONTROLLED TYPE 2 DIABETES MELLITUS WITH HYPERGLYCEMIA, WITHOUT LONG-TERM CURRENT USE OF INSULIN: ICD-10-CM

## 2025-01-16 RX ORDER — METFORMIN HYDROCHLORIDE 500 MG/1
1000 TABLET ORAL
Qty: 120 TABLET | Refills: 5 | Status: SHIPPED | OUTPATIENT
Start: 2025-01-16 | End: 2025-07-15

## 2025-01-16 NOTE — TELEPHONE ENCOUNTER
Rx Refill Request     Name: Tucker Holguin  :  1960     Date of last appointment:  Visit date not found   Date of next appointment:  Visit date not found   Best number to reach patient:  [phone]       ----- Message from Jocelyn Li DO sent at 2/25/2019  8:16 AM CST -----  Please let pt know his Rt knee x-ray shows mild joint space narrowing and small joint effusion. Continue conservative care (eg stretching, compression, leg elevation). If pt has worse/persist sx, recommend to consult with orthopedic again or consider PT services. Advise pt to provide update on how he is doing.

## 2025-01-22 DIAGNOSIS — E11.65 CONTROLLED TYPE 2 DIABETES MELLITUS WITH HYPERGLYCEMIA, WITHOUT LONG-TERM CURRENT USE OF INSULIN: ICD-10-CM

## 2025-01-22 NOTE — TELEPHONE ENCOUNTER
Rx Refill Request     Name: Tucker DRAPER Holguin  :  1960     Date of last appointment:  2024   Date of next appointment:  Visit date not found   Best number to reach patient:  [phone]

## 2025-01-27 ENCOUNTER — TELEPHONE (OUTPATIENT)
Dept: PRIMARY CARE | Facility: CLINIC | Age: 65
End: 2025-01-27
Payer: COMMERCIAL

## 2025-01-27 NOTE — TELEPHONE ENCOUNTER
Prior Authorization for semaglutide (OZEMPIC) 1 mg/dose (4 mg/3 mL) pen injector  has been APPROVED.    CaseId:[id number]  Status:Approved  Review Type:Prior Auth  Coverage Start Date:01/23/2025  Coverage End Date:01/27/2026;

## 2025-03-28 ENCOUNTER — TELEPHONE (OUTPATIENT)
Dept: PRIMARY CARE | Facility: CLINIC | Age: 65
End: 2025-03-28
Payer: COMMERCIAL

## 2025-03-28 NOTE — TELEPHONE ENCOUNTER
Patient called in stating his insurance will not cover ozempic. He is requesting to be switched over to mounjaro. He is needing a prescription sent to the [facility]'s on [facility] if approved  Please Advise

## 2025-03-29 DIAGNOSIS — E11.65 CONTROLLED TYPE 2 DIABETES MELLITUS WITH HYPERGLYCEMIA, WITHOUT LONG-TERM CURRENT USE OF INSULIN: Primary | ICD-10-CM

## 2025-03-29 RX ORDER — TIRZEPATIDE 2.5 MG/.5ML
2.5 INJECTION, SOLUTION SUBCUTANEOUS
Qty: 2 ML | Refills: 0 | Status: SHIPPED | OUTPATIENT
Start: 2025-03-30

## 2025-03-31 ENCOUNTER — TELEPHONE (OUTPATIENT)
Dept: PRIMARY CARE | Facility: CLINIC | Age: 65
End: 2025-03-31
Payer: COMMERCIAL

## 2025-03-31 NOTE — TELEPHONE ENCOUNTER
We received a request for prior authorization on the patient's tirzepatide (Mounjaro) 2.5 mg/0.5 mL pen injector  from their pharmacy. Prior authorization was submitted to insurance today. We will await their determination.

## 2025-04-07 NOTE — TELEPHONE ENCOUNTER
Prior Authorization  has been APPROVED.    Approval valid through 04.07.2026  Approval letter scanned into media on 04.07.20256

## 2025-05-13 DIAGNOSIS — E11.65 CONTROLLED TYPE 2 DIABETES MELLITUS WITH HYPERGLYCEMIA, WITHOUT LONG-TERM CURRENT USE OF INSULIN: Primary | ICD-10-CM

## 2025-05-13 RX ORDER — TIRZEPATIDE 2.5 MG/.5ML
2.5 INJECTION, SOLUTION SUBCUTANEOUS
Qty: 2 ML | Refills: 0 | OUTPATIENT
Start: 2025-05-13

## 2025-05-13 RX ORDER — TIRZEPATIDE 5 MG/.5ML
5 INJECTION, SOLUTION SUBCUTANEOUS
Qty: 2 ML | Refills: 0 | Status: SHIPPED | OUTPATIENT
Start: 2025-05-18

## 2025-05-13 NOTE — TELEPHONE ENCOUNTER
Rx Refill Request Telephone Encounter    DOES PATIENT NEED A1C LAB ORDER?    Name:  Tucker Holguin  : 1960     Medication Name:  tirzepatide (Mounjaro) 2.5 mg/0.5 mL pen injector [[id number]]    Order Details  Dose: 2.5 mg Route: subcutaneous Frequency: Once Weekly   Dispense Quantity: 2 mL (28 day supply) Refills: 0    Duration: -- Dispense As Written: No          Sig: Inject 2.5 mg under the skin 1 (one) time per week.     ALLERGIES:   see list    Specific Pharmacy location:  [id number] [address] AT [facility] & [address] [address]  Phone: [phone]  Fax: [phone]  DAINA #: [id number]     Date of last appointment:  24  Date of next appointment:  25    Best number to reach patient:  [phone]

## 2025-05-30 DIAGNOSIS — I10 HYPERTENSION, UNSPECIFIED TYPE: ICD-10-CM

## 2025-05-30 RX ORDER — LISINOPRIL 20 MG/1
20 TABLET ORAL DAILY
Qty: 90 TABLET | Refills: 3 | Status: SHIPPED | OUTPATIENT
Start: 2025-05-30 | End: 2026-05-30

## 2025-05-30 NOTE — TELEPHONE ENCOUNTER
Received request for prescription refills for patient.   Patient follows with Dr. Schafer    Request is for lisinopril  Is patient currently on medication yes    Last OV 6/17/2024  Next OV no pending visits. Please call patient to schedule then route back to EO clinical. Thank you    Pended for signing and sent to provider

## 2025-06-04 ENCOUNTER — APPOINTMENT (OUTPATIENT)
Dept: PRIMARY CARE | Facility: CLINIC | Age: 65
End: 2025-06-04
Payer: COMMERCIAL

## 2025-06-04 VITALS
BODY MASS INDEX: 39.1 KG/M2 | SYSTOLIC BLOOD PRESSURE: 106 MMHG | RESPIRATION RATE: 16 BRPM | DIASTOLIC BLOOD PRESSURE: 72 MMHG | HEART RATE: 73 BPM | OXYGEN SATURATION: 98 % | TEMPERATURE: 97.6 F | HEIGHT: 68 IN | WEIGHT: 258 LBS

## 2025-06-04 DIAGNOSIS — E66.812 CLASS 2 SEVERE OBESITY DUE TO EXCESS CALORIES WITH SERIOUS COMORBIDITY AND BODY MASS INDEX (BMI) OF 39.0 TO 39.9 IN ADULT: ICD-10-CM

## 2025-06-04 DIAGNOSIS — I10 BENIGN ESSENTIAL HTN: ICD-10-CM

## 2025-06-04 DIAGNOSIS — E78.2 MIXED HYPERLIPIDEMIA: ICD-10-CM

## 2025-06-04 DIAGNOSIS — Z12.5 ENCOUNTER FOR SCREENING FOR MALIGNANT NEOPLASM OF PROSTATE: ICD-10-CM

## 2025-06-04 DIAGNOSIS — E66.01 CLASS 2 SEVERE OBESITY DUE TO EXCESS CALORIES WITH SERIOUS COMORBIDITY AND BODY MASS INDEX (BMI) OF 39.0 TO 39.9 IN ADULT: ICD-10-CM

## 2025-06-04 DIAGNOSIS — E11.65 CONTROLLED TYPE 2 DIABETES MELLITUS WITH HYPERGLYCEMIA, WITHOUT LONG-TERM CURRENT USE OF INSULIN: Primary | ICD-10-CM

## 2025-06-04 PROBLEM — E66.09 CLASS 2 OBESITY DUE TO EXCESS CALORIES WITHOUT SERIOUS COMORBIDITY WITH BODY MASS INDEX (BMI) OF 38.0 TO 38.9 IN ADULT: Status: RESOLVED | Noted: 2023-10-26 | Resolved: 2025-06-04

## 2025-06-04 PROCEDURE — 4010F ACE/ARB THERAPY RXD/TAKEN: CPT | Performed by: FAMILY MEDICINE

## 2025-06-04 PROCEDURE — 3078F DIAST BP <80 MM HG: CPT | Performed by: FAMILY MEDICINE

## 2025-06-04 PROCEDURE — 99214 OFFICE O/P EST MOD 30 MIN: CPT | Performed by: FAMILY MEDICINE

## 2025-06-04 PROCEDURE — 1036F TOBACCO NON-USER: CPT | Performed by: FAMILY MEDICINE

## 2025-06-04 PROCEDURE — 3008F BODY MASS INDEX DOCD: CPT | Performed by: FAMILY MEDICINE

## 2025-06-04 PROCEDURE — 3074F SYST BP LT 130 MM HG: CPT | Performed by: FAMILY MEDICINE

## 2025-06-04 ASSESSMENT — ENCOUNTER SYMPTOMS
DEPRESSION: 0
NERVOUS/ANXIOUS: 0
HUNGER: 0
POLYDIPSIA: 0
WEIGHT LOSS: 0
CONFUSION: 0
HEADACHES: 0
POLYPHAGIA: 0
TREMORS: 0
FATIGUE: 0
SWEATS: 0
LOSS OF SENSATION IN FEET: 0
BLURRED VISION: 0
DIZZINESS: 0
BLACKOUTS: 0
SEIZURES: 0
SPEECH DIFFICULTY: 0
OCCASIONAL FEELINGS OF UNSTEADINESS: 0
VISUAL CHANGE: 0
WEAKNESS: 0

## 2025-06-04 NOTE — PROGRESS NOTES
Subjective   Patient ID: Tucker Holguin is a 64 y.o. male who presents for Follow Up of Hypertension, Diabetes and Hyperlipidemia . I last saw the patient on 11/12/2024  .     Diabetes  He has type 2 diabetes mellitus. No MedicAlert identification noted. The initial diagnosis of diabetes was made 10 years ago. Pertinent negatives for hypoglycemia include no confusion, dizziness, headaches, hunger, mood changes, nervousness/anxiousness, pallor, seizures, sleepiness, speech difficulty, sweats or tremors. Pertinent negatives for diabetes include no blurred vision, no chest pain, no fatigue, no foot paresthesias, no foot ulcerations, no polydipsia, no polyphagia, no polyuria, no visual change, no weakness and no weight loss. Pertinent negatives for hypoglycemia complications include no blackouts, no hospitalization, no nocturnal hypoglycemia, no required assistance and no required glucagon injection. Symptoms are stable. Diabetic complications include impotence. Pertinent negatives for diabetic complications include no CVA, heart disease, nephropathy, peripheral neuropathy, PVD or retinopathy. Risk factors for coronary artery disease include dyslipidemia, family history and obesity. Current diabetic treatment includes diet and oral agent (dual therapy). He is compliant with treatment all of the time. His weight is fluctuating minimally. He is following a generally healthy diet. Meal planning includes avoidance of concentrated sweets and carbohydrate counting. He has not had a previous visit with a dietitian. He participates in exercise every other day. He monitors blood glucose at home 1-2 x per day. He monitors urine at home <1 x per month. Blood glucose monitoring compliance is fair. His home blood glucose trend is fluctuating minimally. His breakfast blood glucose is taken between 7-8 am. His breakfast blood glucose range is generally 110-130 mg/dl. His lunch blood glucose is taken between 11-12 pm. His lunch blood  glucose range is generally 130-140 mg/dl. His dinner blood glucose is taken between 6-7 pm. His dinner blood glucose range is generally 140-180 mg/dl. His bedtime blood glucose is taken between 10-11 pm. His bedtime blood glucose range is generally 130-140 mg/dl. His overall blood glucose range is 130-140 mg/dl. He does not see a podiatrist.Eye exam is not current.     Patient has no medical complaints today or refill requests for rooming MA.   Patient is currently on Mounjaro  Last in office weight - 251.0lb  Today's in office weight - 258.0lb  Patient denies any side effects.    Patient is checking his blood sugars at home.    Patient discontinue pantoprazole and he does not note any issues without it.    Patient would like to have a referral to eye doctor.    Past medical, surgical, and family history reviewed.  Reviewed and documented all medications   Pt eating well, exercising as tolerated and taking medications as directed.      Review of Systems   Constitutional:  Negative for fatigue and weight loss.   Eyes:  Negative for blurred vision.   Cardiovascular:  Negative for chest pain.   Endocrine: Negative for polydipsia, polyphagia and polyuria.   Genitourinary:  Positive for impotence.   Skin:  Negative for pallor.   Neurological:  Negative for dizziness, tremors, seizures, speech difficulty, weakness and headaches.   Psychiatric/Behavioral:  Negative for confusion. The patient is not nervous/anxious.      Except positives as noted in the CC & HPI      Constitutional: Denies fevers, chills, night sweats, fatigue, weight changes, change in appetite    Eyes: Denies blurry vision, double vision    ENT: Denies otalgia, trouble hearing, tinnitus, vertigo, nasal congestion, rhinorrhea, sore throat    Neck: Denies swelling, masses    Cardiovascular: Denies chest pain, palpitations, edema, orthopnea, syncope    Respiratory: Denies dyspnea, cough, wheezing, postural nocturnal dyspnea    Gastrointestinal: Denies  "abdominal pain, nausea, vomiting, diarrhea, constipation, melena, hematochezia    Genitourinary: Denies dysuria, hematuria  Musculoskeletal: Denies back pain, neck pain, arthralgias, myalgias    Integumentary: Denies skin lesions, rashes, masses    Neurological: Denies dizziness, headaches, confusion, limb weakness, paresthesias, syncope, convulsions    Psychiatric: Denies depression, anxiety, homicidal ideations, suicidal ideations, sleep disturbances    Endocrine: Denies polyphagia, polydipsia, polyuria, weakness, hair thinning, heat intolerance, cold intolerance, weight changes    Heme/Lymph: Denies easy bruising, easy bleeding, swollen glands    Objective   Visit Vitals  /72 (BP Location: Right arm, Patient Position: Sitting, BP Cuff Size: Large adult)   Pulse 73   Temp 36.4 °C (97.6 °F) (Temporal)   Resp 16   Ht 1.727 m (5' 8\")   Wt 117 kg (258 lb)   SpO2 98%   BMI 39.23 kg/m²   Smoking Status Never   BSA 2.37 m²       Physical Exam  General Appearance - well-developed, well-nourished, 64 y.o., White male in no acute distress.      Skin - Warm, pink and dry without rash or concerning lesions.      Mental Status - alert and oriented x 3. Normal mood and affect appropriate to mood.      Neck - supple without lymphadenopathy. Carotid pulses are normal without bruits. Thyroid is normal in midline without nodules.      Chest - lungs are clear to auscultation without rales, rhonchi or wheezes.      Heart - regular, rate and rhythm without murmurs, rubs or gallops. Grade 1/6 systolic ejection murmur hears best at the right sternal border, 2nd ICS.      Abdomen - soft, obese, protuberant, nontender, nondistended. No masses, hepatomegaly or splenomegaly is noted. No rebound, rigidity or guarding is noted. Bowel sounds are normoactive.     Extremities - no cyanosis, clubbing or edema. Pedal pulses are 2+ normal at the dorsalis pedis and posterior pulses bilaterally.      Neurological - cranial nerves II through XII " are grossly intact. Motor strength 5/5 at all fours.     Assessment   1. Controlled type 2 diabetes mellitus with hyperglycemia, without long-term current use of insulin  Comprehensive metabolic panel    Albumin-Creatinine Ratio, Urine Random    Comprehensive metabolic panel    Albumin-Creatinine Ratio, Urine Random    Referral to Ophthalmology      2. Benign essential HTN  CBC and Auto Differential    CBC and Auto Differential      3. Mixed hyperlipidemia  Lipid panel    Lipid panel      4. Encounter for screening for malignant neoplasm of prostate  Prostate Spec.Ag,Screen    Prostate Spec.Ag,Screen      5. Class 2 severe obesity due to excess calories with serious comorbidity and body mass index (BMI) of 39.0 to 39.9 in adult            Patient will continue on a diabetic, low-cholesterol diet and weight reduction. Exercise as tolerated. Patient will continue medications as prescribed. Follow-up in 3-6 month(s) otherwise as needed.     Patient is to return for fasting CBC and Auto Differential, Comprehensive Metabolic Panel, Lipid profile, PSA labs at their convenience prior to their next appointment after 15th July, 2025. Fasting is nothing to eat or drink except water or black coffee for 8-12 hours. Will call patient with results when available.       Ordered Albumin-Creatinine Ratio, Urine Random. Will call patient with results when available.       Refer patient to ophthalmology for diabetic eye care.     Scribe Attestation  By signing my name below, IDonna Scribe   attest that this documentation has been prepared under the direction and in the presence of Darian Munguia MD.      This note has been transcribed using a medical scribe and there is a possibility of unintentional typing misprints.     All medical record entries made by the scribe were at my direction and personally dictated by me. I have reviewed the chart and agree that the record accurately reflects my personal performance of the  history, physical exam, discussion, and plan.     RACHID SMITH M.D.

## 2025-06-17 DIAGNOSIS — E11.65 CONTROLLED TYPE 2 DIABETES MELLITUS WITH HYPERGLYCEMIA, WITHOUT LONG-TERM CURRENT USE OF INSULIN: ICD-10-CM

## 2025-06-17 RX ORDER — TIRZEPATIDE 5 MG/.5ML
5 INJECTION, SOLUTION SUBCUTANEOUS
Qty: 2 ML | Refills: 2 | Status: SHIPPED | OUTPATIENT
Start: 2025-06-17

## 2025-06-17 NOTE — TELEPHONE ENCOUNTER
Rx Refill Request     Name: Tucker Holguin  :  1960   Medication Name:  mounjaro  Specific Pharmacy location:  [facility]   Date of last appointment:  2025   Date of next appointment:  Visit date not found   Best number to reach patient:  [phone]

## 2025-06-29 DIAGNOSIS — E78.2 MIXED HYPERLIPIDEMIA: ICD-10-CM

## 2025-06-30 ENCOUNTER — APPOINTMENT (OUTPATIENT)
Dept: OPHTHALMOLOGY | Facility: CLINIC | Age: 65
End: 2025-06-30
Payer: COMMERCIAL

## 2025-06-30 DIAGNOSIS — H52.4 PRESBYOPIA OF BOTH EYES: Primary | ICD-10-CM

## 2025-06-30 DIAGNOSIS — H25.13 AGE-RELATED NUCLEAR CATARACT OF BOTH EYES: ICD-10-CM

## 2025-06-30 DIAGNOSIS — E11.9 TYPE 2 DIABETES MELLITUS WITHOUT RETINOPATHY (MULTI): ICD-10-CM

## 2025-06-30 DIAGNOSIS — E11.65 CONTROLLED TYPE 2 DIABETES MELLITUS WITH HYPERGLYCEMIA, WITHOUT LONG-TERM CURRENT USE OF INSULIN: ICD-10-CM

## 2025-06-30 PROCEDURE — 92004 COMPRE OPH EXAM NEW PT 1/>: CPT | Performed by: STUDENT IN AN ORGANIZED HEALTH CARE EDUCATION/TRAINING PROGRAM

## 2025-06-30 RX ORDER — ROSUVASTATIN CALCIUM 10 MG/1
10 TABLET, COATED ORAL DAILY
Qty: 90 TABLET | Refills: 0 | Status: SHIPPED | OUTPATIENT
Start: 2025-06-30 | End: 2025-09-28

## 2025-06-30 ASSESSMENT — TONOMETRY
OD_IOP_MMHG: 16
OS_IOP_MMHG: 16
IOP_METHOD: GOLDMANN APPLANATION

## 2025-06-30 ASSESSMENT — ENCOUNTER SYMPTOMS
ENDOCRINE NEGATIVE: 0
ALLERGIC/IMMUNOLOGIC NEGATIVE: 0
NEUROLOGICAL NEGATIVE: 0
EYES NEGATIVE: 0
CARDIOVASCULAR NEGATIVE: 0
MUSCULOSKELETAL NEGATIVE: 0
HEMATOLOGIC/LYMPHATIC NEGATIVE: 0
RESPIRATORY NEGATIVE: 0
CONSTITUTIONAL NEGATIVE: 0
PSYCHIATRIC NEGATIVE: 0
GASTROINTESTINAL NEGATIVE: 0

## 2025-06-30 ASSESSMENT — SLIT LAMP EXAM - LIDS
COMMENTS: NORMAL
COMMENTS: NORMAL

## 2025-06-30 ASSESSMENT — CONF VISUAL FIELD
OD_SUPERIOR_TEMPORAL_RESTRICTION: 0
OD_INFERIOR_TEMPORAL_RESTRICTION: 0
OS_INFERIOR_TEMPORAL_RESTRICTION: 0
OD_NORMAL: 1
OD_SUPERIOR_NASAL_RESTRICTION: 0
METHOD: COUNTING FINGERS
OD_INFERIOR_NASAL_RESTRICTION: 0
OS_SUPERIOR_NASAL_RESTRICTION: 0
OS_NORMAL: 1
OS_INFERIOR_NASAL_RESTRICTION: 0
OS_SUPERIOR_TEMPORAL_RESTRICTION: 0

## 2025-06-30 ASSESSMENT — VISUAL ACUITY
METHOD: SNELLEN - LINEAR
OD_SC: 20/25
OD_SC+: +1
OS_SC: 20/20

## 2025-06-30 ASSESSMENT — REFRACTION_MANIFEST
OS_SPHERE: DECLINES
OD_SPHERE: DECLINES

## 2025-06-30 ASSESSMENT — EXTERNAL EXAM - RIGHT EYE: OD_EXAM: NORMAL

## 2025-06-30 ASSESSMENT — REFRACTION_WEARINGRX
OS_SPHERE: +1.00
OD_SPHERE: +1.00
OD_CYLINDER: OTC
OS_CYLINDER: OTC

## 2025-06-30 ASSESSMENT — EXTERNAL EXAM - LEFT EYE: OS_EXAM: NORMAL

## 2025-06-30 ASSESSMENT — CUP TO DISC RATIO
OD_RATIO: .20
OS_RATIO: .20

## 2025-06-30 NOTE — TELEPHONE ENCOUNTER
Received request for prescription refills for patient.   Patient follows with Dr. Schafer    Request is for rosuvastatin  Is patient currently on medication yes    Last OV 6/17/2024  Next OV 8/11/2025--- 90 day supply pended until patients appointment    Pended for signing and sent to provider

## 2025-06-30 NOTE — PROGRESS NOTES
Assessment/Plan   Diagnoses and all orders for this visit:  Presbyopia of both eyes  -patient doing well with OTC NVO specs prn for near and computer use  -defers MRX today  Age-related nuclear cataract of both eyes  -non visually significant-monitor  Type 2 diabetes mellitus without retinopathy (Multi)  -no retinopathy observed on exam today od/os, pt ed to continue good BGlc, blood pressure and lipid control, rtc with any changes in vision, otherwise monitor 1 year  Sleep Apnea  -patient reports CPAP machine will sometimes leak and blow air into his eyes  -discussed use of Genteal/Optase/or Systane guanakito or Refresh/Systane Gel Tears  -discussed association with FES   -monitor   History of Strabismus Sx  -good binocular posture today    RTC 1 year for annual with DFE and MRX

## 2025-07-18 DIAGNOSIS — E11.65 CONTROLLED TYPE 2 DIABETES MELLITUS WITH HYPERGLYCEMIA, WITHOUT LONG-TERM CURRENT USE OF INSULIN: ICD-10-CM

## 2025-07-18 NOTE — TELEPHONE ENCOUNTER
Rx Refill Request     Name: Tucker DRAPER Holguin  :  1960   Medication Name:  metformin   Specific Pharmacy location:  [facility]   Date of last appointment:  2025   Date of next appointment:  Visit date not found   Best number to reach patient:  [phone]

## 2025-07-19 RX ORDER — METFORMIN HYDROCHLORIDE 500 MG/1
1000 TABLET ORAL
Qty: 120 TABLET | Refills: 5 | Status: SHIPPED | OUTPATIENT
Start: 2025-07-19 | End: 2026-01-15

## 2025-08-08 DIAGNOSIS — E11.65 CONTROLLED TYPE 2 DIABETES MELLITUS WITH HYPERGLYCEMIA, WITHOUT LONG-TERM CURRENT USE OF INSULIN: ICD-10-CM

## 2025-08-08 NOTE — TELEPHONE ENCOUNTER
Rx Refill Request     Name: Tucker Holguin  :  1960   Medication Name:  farxiga  Specific Pharmacy location:  [facility]   Date of last appointment:  Visit date not found   Date of next appointment:  Visit date not found   Best number to reach patient:  [phone]       Recent Visits  Date Type Provider Dept   25 Office Visit Darian Munguia MD Do [id number] [facility]   24 Office Visit Darian Munguia MD Do [id number] [facility]   24 Office Visit Darian Munguia MD Do [id number] [facility]   24 Office Visit Darian Munguia MD Do [id number] [facility]   Showing recent visits within past 540 days and meeting all other requirements  Future Appointments  No visits were found meeting these conditions.  Showing future appointments within next 180 days and meeting all other requirements

## 2025-08-09 RX ORDER — DAPAGLIFLOZIN 10 MG/1
10 TABLET, FILM COATED ORAL
Qty: 90 TABLET | Refills: 3 | Status: SHIPPED | OUTPATIENT
Start: 2025-08-09

## 2025-08-11 ENCOUNTER — APPOINTMENT (OUTPATIENT)
Dept: CARDIOLOGY | Facility: CLINIC | Age: 65
End: 2025-08-11
Payer: COMMERCIAL

## 2025-08-11 VITALS
SYSTOLIC BLOOD PRESSURE: 110 MMHG | BODY MASS INDEX: 39.59 KG/M2 | WEIGHT: 261.2 LBS | HEART RATE: 77 BPM | HEIGHT: 68 IN | DIASTOLIC BLOOD PRESSURE: 70 MMHG

## 2025-08-11 DIAGNOSIS — I10 BENIGN ESSENTIAL HTN: ICD-10-CM

## 2025-08-11 DIAGNOSIS — E11.9 TYPE 2 DIABETES MELLITUS WITHOUT RETINOPATHY (MULTI): ICD-10-CM

## 2025-08-11 DIAGNOSIS — R01.1 SYSTOLIC EJECTION MURMUR: ICD-10-CM

## 2025-08-11 DIAGNOSIS — Z78.9 NEVER SMOKED TOBACCO: ICD-10-CM

## 2025-08-11 DIAGNOSIS — E78.2 MIXED HYPERLIPIDEMIA: ICD-10-CM

## 2025-08-11 PROCEDURE — 3078F DIAST BP <80 MM HG: CPT | Performed by: INTERNAL MEDICINE

## 2025-08-11 PROCEDURE — 3074F SYST BP LT 130 MM HG: CPT | Performed by: INTERNAL MEDICINE

## 2025-08-11 PROCEDURE — 99214 OFFICE O/P EST MOD 30 MIN: CPT | Performed by: INTERNAL MEDICINE

## 2025-08-11 PROCEDURE — 1036F TOBACCO NON-USER: CPT | Performed by: INTERNAL MEDICINE

## 2025-08-11 PROCEDURE — 3008F BODY MASS INDEX DOCD: CPT | Performed by: INTERNAL MEDICINE

## 2025-08-11 PROCEDURE — 4010F ACE/ARB THERAPY RXD/TAKEN: CPT | Performed by: INTERNAL MEDICINE

## 2025-08-11 RX ORDER — ROSUVASTATIN CALCIUM 10 MG/1
10 TABLET, COATED ORAL DAILY
Qty: 90 TABLET | Refills: 3 | Status: SHIPPED | OUTPATIENT
Start: 2025-08-11 | End: 2026-08-11

## 2026-07-01 ENCOUNTER — APPOINTMENT (OUTPATIENT)
Dept: OPHTHALMOLOGY | Facility: CLINIC | Age: 66
End: 2026-07-01
Payer: COMMERCIAL

## 2026-08-17 ENCOUNTER — APPOINTMENT (OUTPATIENT)
Dept: CARDIOLOGY | Facility: CLINIC | Age: 66
End: 2026-08-17
Payer: COMMERCIAL